# Patient Record
Sex: FEMALE | Race: WHITE | Employment: UNEMPLOYED | URBAN - NONMETROPOLITAN AREA
[De-identification: names, ages, dates, MRNs, and addresses within clinical notes are randomized per-mention and may not be internally consistent; named-entity substitution may affect disease eponyms.]

---

## 2019-11-05 ENCOUNTER — OFFICE VISIT (OUTPATIENT)
Dept: FAMILY MEDICINE CLINIC | Age: 39
End: 2019-11-05

## 2019-11-05 VITALS
HEIGHT: 68 IN | SYSTOLIC BLOOD PRESSURE: 106 MMHG | HEART RATE: 74 BPM | DIASTOLIC BLOOD PRESSURE: 60 MMHG | WEIGHT: 132 LBS | BODY MASS INDEX: 20 KG/M2 | RESPIRATION RATE: 14 BRPM

## 2019-11-05 DIAGNOSIS — F32.A DEPRESSION, UNSPECIFIED DEPRESSION TYPE: ICD-10-CM

## 2019-11-05 DIAGNOSIS — Z13.31 POSITIVE DEPRESSION SCREENING: ICD-10-CM

## 2019-11-05 DIAGNOSIS — G47.00 INSOMNIA, UNSPECIFIED TYPE: ICD-10-CM

## 2019-11-05 DIAGNOSIS — Z00.01 ENCOUNTER FOR WELL ADULT EXAM WITH ABNORMAL FINDINGS: Primary | ICD-10-CM

## 2019-11-05 DIAGNOSIS — R53.82 CHRONIC FATIGUE: ICD-10-CM

## 2019-11-05 PROCEDURE — 99214 OFFICE O/P EST MOD 30 MIN: CPT | Performed by: NURSE PRACTITIONER

## 2019-11-05 PROCEDURE — G0444 DEPRESSION SCREEN ANNUAL: HCPCS | Performed by: NURSE PRACTITIONER

## 2019-11-05 PROCEDURE — G8431 POS CLIN DEPRES SCRN F/U DOC: HCPCS | Performed by: NURSE PRACTITIONER

## 2019-11-05 RX ORDER — TEMAZEPAM 15 MG/1
30 CAPSULE ORAL NIGHTLY PRN
COMMUNITY
Start: 2018-07-27

## 2019-11-05 RX ORDER — DOXEPIN HYDROCHLORIDE 50 MG/1
50 CAPSULE ORAL NIGHTLY
Qty: 30 CAPSULE | Refills: 0 | Status: SHIPPED | OUTPATIENT
Start: 2019-11-05 | End: 2019-11-11

## 2019-11-05 SDOH — HEALTH STABILITY: MENTAL HEALTH: HOW OFTEN DO YOU HAVE A DRINK CONTAINING ALCOHOL?: NEVER

## 2019-11-05 ASSESSMENT — ENCOUNTER SYMPTOMS
SHORTNESS OF BREATH: 0
EYE ITCHING: 0
SINUS PRESSURE: 0
CONSTIPATION: 0
SORE THROAT: 0
COUGH: 0
EYE PAIN: 0
ABDOMINAL PAIN: 0
COLOR CHANGE: 0
DIARRHEA: 0
WHEEZING: 0
BACK PAIN: 0
EYE DISCHARGE: 0
EYE REDNESS: 0

## 2019-11-05 ASSESSMENT — PATIENT HEALTH QUESTIONNAIRE - PHQ9
6. FEELING BAD ABOUT YOURSELF - OR THAT YOU ARE A FAILURE OR HAVE LET YOURSELF OR YOUR FAMILY DOWN: 0
10. IF YOU CHECKED OFF ANY PROBLEMS, HOW DIFFICULT HAVE THESE PROBLEMS MADE IT FOR YOU TO DO YOUR WORK, TAKE CARE OF THINGS AT HOME, OR GET ALONG WITH OTHER PEOPLE: 3
9. THOUGHTS THAT YOU WOULD BE BETTER OFF DEAD, OR OF HURTING YOURSELF: 1
5. POOR APPETITE OR OVEREATING: 3
SUM OF ALL RESPONSES TO PHQ QUESTIONS 1-9: 16
7. TROUBLE CONCENTRATING ON THINGS, SUCH AS READING THE NEWSPAPER OR WATCHING TELEVISION: 0
2. FEELING DOWN, DEPRESSED OR HOPELESS: 3
SUM OF ALL RESPONSES TO PHQ QUESTIONS 1-9: 16
3. TROUBLE FALLING OR STAYING ASLEEP: 3
SUM OF ALL RESPONSES TO PHQ9 QUESTIONS 1 & 2: 6
1. LITTLE INTEREST OR PLEASURE IN DOING THINGS: 3
4. FEELING TIRED OR HAVING LITTLE ENERGY: 3
8. MOVING OR SPEAKING SO SLOWLY THAT OTHER PEOPLE COULD HAVE NOTICED. OR THE OPPOSITE, BEING SO FIGETY OR RESTLESS THAT YOU HAVE BEEN MOVING AROUND A LOT MORE THAN USUAL: 0

## 2019-11-11 ENCOUNTER — TELEPHONE (OUTPATIENT)
Dept: FAMILY MEDICINE CLINIC | Age: 39
End: 2019-11-11

## 2019-11-11 DIAGNOSIS — F32.A DEPRESSION, UNSPECIFIED DEPRESSION TYPE: ICD-10-CM

## 2019-11-11 DIAGNOSIS — G47.00 INSOMNIA, UNSPECIFIED TYPE: ICD-10-CM

## 2019-11-11 DIAGNOSIS — G47.00 INSOMNIA, UNSPECIFIED TYPE: Primary | ICD-10-CM

## 2019-11-11 RX ORDER — DOXEPIN HYDROCHLORIDE 100 MG/1
100 CAPSULE ORAL NIGHTLY
Qty: 30 CAPSULE | Refills: 0 | Status: SHIPPED | OUTPATIENT
Start: 2019-11-11 | End: 2019-11-18

## 2019-11-18 ENCOUNTER — TELEPHONE (OUTPATIENT)
Dept: FAMILY MEDICINE CLINIC | Age: 39
End: 2019-11-18

## 2019-11-18 DIAGNOSIS — F32.A DEPRESSION, UNSPECIFIED DEPRESSION TYPE: ICD-10-CM

## 2019-11-18 DIAGNOSIS — G47.00 INSOMNIA, UNSPECIFIED TYPE: ICD-10-CM

## 2019-11-18 RX ORDER — DOXEPIN HYDROCHLORIDE 150 MG/1
150 CAPSULE ORAL NIGHTLY
Qty: 30 CAPSULE | Refills: 0 | Status: SHIPPED | OUTPATIENT
Start: 2019-11-18 | End: 2019-11-27 | Stop reason: SDUPTHER

## 2019-11-27 ENCOUNTER — OFFICE VISIT (OUTPATIENT)
Dept: FAMILY MEDICINE CLINIC | Age: 39
End: 2019-11-27

## 2019-11-27 VITALS
WEIGHT: 135 LBS | SYSTOLIC BLOOD PRESSURE: 96 MMHG | BODY MASS INDEX: 20.77 KG/M2 | DIASTOLIC BLOOD PRESSURE: 60 MMHG | RESPIRATION RATE: 12 BRPM | HEART RATE: 60 BPM

## 2019-11-27 DIAGNOSIS — F32.A DEPRESSION, UNSPECIFIED DEPRESSION TYPE: Primary | ICD-10-CM

## 2019-11-27 DIAGNOSIS — G47.00 INSOMNIA, UNSPECIFIED TYPE: ICD-10-CM

## 2019-11-27 PROBLEM — G47.9 RESTLESS SLEEPER: Status: ACTIVE | Noted: 2019-11-12

## 2019-11-27 PROCEDURE — 99213 OFFICE O/P EST LOW 20 MIN: CPT | Performed by: NURSE PRACTITIONER

## 2019-11-27 RX ORDER — DOXEPIN HYDROCHLORIDE 150 MG/1
150 CAPSULE ORAL NIGHTLY
Qty: 90 CAPSULE | Refills: 1 | Status: ON HOLD | OUTPATIENT
Start: 2019-12-18 | End: 2021-12-14 | Stop reason: SDUPTHER

## 2019-11-27 ASSESSMENT — ENCOUNTER SYMPTOMS
ABDOMINAL PAIN: 0
EYE DISCHARGE: 0
SINUS PRESSURE: 0
EYE ITCHING: 0
WHEEZING: 0
CONSTIPATION: 0
DIARRHEA: 0
BACK PAIN: 0
COUGH: 0
EYE PAIN: 0
COLOR CHANGE: 0
SHORTNESS OF BREATH: 0

## 2019-11-27 ASSESSMENT — PATIENT HEALTH QUESTIONNAIRE - PHQ9
1. LITTLE INTEREST OR PLEASURE IN DOING THINGS: 0
SUM OF ALL RESPONSES TO PHQ9 QUESTIONS 1 & 2: 0
SUM OF ALL RESPONSES TO PHQ QUESTIONS 1-9: 0
SUM OF ALL RESPONSES TO PHQ QUESTIONS 1-9: 0
2. FEELING DOWN, DEPRESSED OR HOPELESS: 0

## 2021-12-12 ENCOUNTER — HOSPITAL ENCOUNTER (INPATIENT)
Age: 41
LOS: 2 days | Discharge: HOME | DRG: 917 | End: 2021-12-14
Attending: FAMILY MEDICINE | Admitting: INTERNAL MEDICINE
Payer: COMMERCIAL

## 2021-12-12 ENCOUNTER — APPOINTMENT (OUTPATIENT)
Dept: GENERAL RADIOLOGY | Age: 41
DRG: 917 | End: 2021-12-12

## 2021-12-12 DIAGNOSIS — T43.221A: Primary | ICD-10-CM

## 2021-12-12 DIAGNOSIS — F32.A DEPRESSION, UNSPECIFIED DEPRESSION TYPE: ICD-10-CM

## 2021-12-12 DIAGNOSIS — G47.00 INSOMNIA, UNSPECIFIED TYPE: ICD-10-CM

## 2021-12-12 PROBLEM — T50.902A DRUG OVERDOSE, INTENTIONAL SELF-HARM, INITIAL ENCOUNTER (HCC): Status: ACTIVE | Noted: 2021-12-12

## 2021-12-12 LAB
ACETAMINOPHEN LEVEL: < 5 UG/ML (ref 0–20)
ALBUMIN SERPL-MCNC: 4.5 G/DL (ref 3.5–5.1)
ALLEN TEST: POSITIVE
ALLEN TEST: POSITIVE
ALP BLD-CCNC: 59 U/L (ref 38–126)
ALT SERPL-CCNC: 18 U/L (ref 11–66)
AMPHETAMINE+METHAMPHETAMINE URINE SCREEN: NEGATIVE
ANION GAP SERPL CALCULATED.3IONS-SCNC: 14 MEQ/L (ref 8–16)
AST SERPL-CCNC: 25 U/L (ref 5–40)
BACTERIA: ABNORMAL
BARBITURATE QUANTITATIVE URINE: NEGATIVE
BASE EXCESS (CALCULATED): -1.4 MMOL/L (ref -2.5–2.5)
BASE EXCESS (CALCULATED): -2.6 MMOL/L (ref -2.5–2.5)
BASOPHILS # BLD: 0.9 %
BASOPHILS ABSOLUTE: 0 THOU/MM3 (ref 0–0.1)
BENZODIAZEPINE QUANTITATIVE URINE: NEGATIVE
BILIRUB SERPL-MCNC: 0.6 MG/DL (ref 0.3–1.2)
BILIRUBIN DIRECT: < 0.2 MG/DL (ref 0–0.3)
BILIRUBIN URINE: NEGATIVE
BLOOD, URINE: NEGATIVE
BUN BLDV-MCNC: 8 MG/DL (ref 7–22)
CALCIUM IONIZED SERUM: 1.12 MMOL/L (ref 1.12–1.32)
CALCIUM SERPL-MCNC: 9 MG/DL (ref 8.5–10.5)
CANNABINOID QUANTITATIVE URINE: NEGATIVE
CASTS: ABNORMAL /LPF
CASTS: ABNORMAL /LPF
CHARACTER, URINE: ABNORMAL
CHLORIDE BLD-SCNC: 103 MEQ/L (ref 98–111)
CHLORIDE, WHOLE BLOOD: 106 MEQ/L (ref 98–109)
CO2: 21 MEQ/L (ref 23–33)
COCAINE METABOLITE QUANTITATIVE URINE: NEGATIVE
COLLECTED BY:: ABNORMAL
COLLECTED BY:: ABNORMAL
COLOR: YELLOW
CREAT SERPL-MCNC: 0.8 MG/DL (ref 0.4–1.2)
CRYSTALS: ABNORMAL
DEVICE: ABNORMAL
DEVICE: ABNORMAL
EKG ATRIAL RATE: 102 BPM
EKG ATRIAL RATE: 104 BPM
EKG ATRIAL RATE: 129 BPM
EKG ATRIAL RATE: 89 BPM
EKG ATRIAL RATE: 89 BPM
EKG ATRIAL RATE: 92 BPM
EKG ATRIAL RATE: 98 BPM
EKG P AXIS: -56 DEGREES
EKG P AXIS: 73 DEGREES
EKG P AXIS: 74 DEGREES
EKG P AXIS: 74 DEGREES
EKG P AXIS: 77 DEGREES
EKG P AXIS: 83 DEGREES
EKG P AXIS: 83 DEGREES
EKG P-R INTERVAL: 144 MS
EKG P-R INTERVAL: 152 MS
EKG P-R INTERVAL: 164 MS
EKG P-R INTERVAL: 180 MS
EKG P-R INTERVAL: 196 MS
EKG P-R INTERVAL: 202 MS
EKG P-R INTERVAL: 202 MS
EKG Q-T INTERVAL: 386 MS
EKG Q-T INTERVAL: 408 MS
EKG Q-T INTERVAL: 416 MS
EKG Q-T INTERVAL: 422 MS
EKG Q-T INTERVAL: 422 MS
EKG Q-T INTERVAL: 436 MS
EKG Q-T INTERVAL: 438 MS
EKG QRS DURATION: 106 MS
EKG QRS DURATION: 112 MS
EKG QRS DURATION: 116 MS
EKG QRS DURATION: 138 MS
EKG QRS DURATION: 152 MS
EKG QRS DURATION: 154 MS
EKG QRS DURATION: 160 MS
EKG QTC CALCULATION (BAZETT): 513 MS
EKG QTC CALCULATION (BAZETT): 521 MS
EKG QTC CALCULATION (BAZETT): 531 MS
EKG QTC CALCULATION (BAZETT): 532 MS
EKG QTC CALCULATION (BAZETT): 536 MS
EKG QTC CALCULATION (BAZETT): 565 MS
EKG QTC CALCULATION (BAZETT): 568 MS
EKG R AXIS: -63 DEGREES
EKG R AXIS: -65 DEGREES
EKG R AXIS: -69 DEGREES
EKG R AXIS: 39 DEGREES
EKG R AXIS: 78 DEGREES
EKG R AXIS: 79 DEGREES
EKG R AXIS: 8 DEGREES
EKG T AXIS: 79 DEGREES
EKG T AXIS: 80 DEGREES
EKG T AXIS: 82 DEGREES
EKG T AXIS: 83 DEGREES
EKG T AXIS: 83 DEGREES
EKG T AXIS: 84 DEGREES
EKG T AXIS: 86 DEGREES
EKG VENTRICULAR RATE: 102 BPM
EKG VENTRICULAR RATE: 104 BPM
EKG VENTRICULAR RATE: 129 BPM
EKG VENTRICULAR RATE: 89 BPM
EKG VENTRICULAR RATE: 89 BPM
EKG VENTRICULAR RATE: 92 BPM
EKG VENTRICULAR RATE: 98 BPM
EOSINOPHIL # BLD: 2 %
EOSINOPHILS ABSOLUTE: 0.1 THOU/MM3 (ref 0–0.4)
EPITHELIAL CELLS, UA: ABNORMAL /HPF
ERYTHROCYTE [DISTWIDTH] IN BLOOD BY AUTOMATED COUNT: 11.9 % (ref 11.5–14.5)
ERYTHROCYTE [DISTWIDTH] IN BLOOD BY AUTOMATED COUNT: 41.3 FL (ref 35–45)
ETHYL ALCOHOL, SERUM: < 0.01 %
GFR SERPL CREATININE-BSD FRML MDRD: 80 ML/MIN/1.73M2
GLUCOSE BLD-MCNC: 134 MG/DL (ref 70–108)
GLUCOSE BLD-MCNC: 141 MG/DL (ref 70–108)
GLUCOSE, URINE: NEGATIVE MG/DL
GLUCOSE, WHOLE BLOOD: 134 MG/DL (ref 70–108)
HCG,BETA SUBUNIT,QUAL,SERUM: < 0.1 MIU/ML (ref 0–5)
HCO3: 23 MMOL/L (ref 23–28)
HCO3: 23 MMOL/L (ref 23–28)
HCT VFR BLD CALC: 41.7 % (ref 37–47)
HEMOGLOBIN: 14.2 GM/DL (ref 12–16)
IFIO2: 30
IFIO2: 50
IMMATURE GRANS (ABS): 0 THOU/MM3 (ref 0–0.07)
IMMATURE GRANULOCYTES: 0 %
KETONES, URINE: NEGATIVE
LEUKOCYTE ESTERASE, URINE: NEGATIVE
LYMPHOCYTES # BLD: 53.8 %
LYMPHOCYTES ABSOLUTE: 3 THOU/MM3 (ref 1–4.8)
MAGNESIUM: 2.1 MG/DL (ref 1.6–2.4)
MAGNESIUM: 2.1 MG/DL (ref 1.6–2.4)
MAGNESIUM: 2.5 MG/DL (ref 1.6–2.4)
MCH RBC QN AUTO: 32.7 PG (ref 26–33)
MCHC RBC AUTO-ENTMCNC: 34.1 GM/DL (ref 32.2–35.5)
MCV RBC AUTO: 96.1 FL (ref 81–99)
MISCELLANEOUS LAB TEST RESULT: ABNORMAL
MODE: ABNORMAL
MODE: ABNORMAL
MONOCYTES # BLD: 9 %
MONOCYTES ABSOLUTE: 0.5 THOU/MM3 (ref 0.4–1.3)
MRSA SCREEN RT-PCR: NEGATIVE
MUCUS: ABNORMAL
NITRITE, URINE: NEGATIVE
NUCLEATED RED BLOOD CELLS: 0 /100 WBC
O2 SATURATION: 100 %
O2 SATURATION: 99 %
OPIATES, URINE: NEGATIVE
OXYCODONE: NEGATIVE
PCO2: 34 MMHG (ref 35–45)
PCO2: 42 MMHG (ref 35–45)
PH BLOOD GAS: 7.35 (ref 7.35–7.45)
PH BLOOD GAS: 7.43 (ref 7.35–7.45)
PH UA: 8 (ref 5–9)
PHENCYCLIDINE QUANTITATIVE URINE: NEGATIVE
PHOSPHORUS: 1.6 MG/DL (ref 2.4–4.7)
PHOSPHORUS: 2.7 MG/DL (ref 2.4–4.7)
PIP: 16 CMH2O
PIP: 16 CMH2O
PLATELET # BLD: 241 THOU/MM3 (ref 130–400)
PMV BLD AUTO: 9.6 FL (ref 9.4–12.4)
PO2: 143 MMHG (ref 71–104)
PO2: 252 MMHG (ref 71–104)
POC LACTIC ACID: 1 MMOL/L (ref 0.5–1.9)
POTASSIUM REFLEX MAGNESIUM: 3 MEQ/L (ref 3.5–5.2)
POTASSIUM SERPL-SCNC: 3.7 MEQ/L (ref 3.5–5.2)
POTASSIUM SERPL-SCNC: 3.8 MEQ/L (ref 3.5–5.2)
POTASSIUM, WHOLE BLOOD: 2.7 MEQ/L (ref 3.5–4.9)
PROTEIN UA: ABNORMAL MG/DL
RBC # BLD: 4.34 MILL/MM3 (ref 4.2–5.4)
RBC URINE: ABNORMAL /HPF
RENAL EPITHELIAL, UA: ABNORMAL
SALICYLATE, SERUM: < 0.3 MG/DL (ref 2–10)
SARS-COV-2, NAAT: NOT DETECTED
SEG NEUTROPHILS: 34.3 %
SEGMENTED NEUTROPHILS ABSOLUTE COUNT: 1.9 THOU/MM3 (ref 1.8–7.7)
SET PEEP: 6 MMHG
SET PEEP: 7 MMHG
SET RESPIRATORY RATE: 14 BPM
SET RESPIRATORY RATE: 14 BPM
SODIUM BLD-SCNC: 138 MEQ/L (ref 135–145)
SODIUM, WHOLE BLOOD: 143 MEQ/L (ref 138–146)
SOURCE, BLOOD GAS: ABNORMAL
SOURCE, BLOOD GAS: ABNORMAL
SPECIFIC GRAVITY UA: 1.01 (ref 1–1.03)
TOTAL PROTEIN: 7.3 G/DL (ref 6.1–8)
UROBILINOGEN, URINE: 0.2 EU/DL (ref 0–1)
VANCOMYCIN RESISTANT ENTEROCOCCUS: NEGATIVE
WBC # BLD: 5.5 THOU/MM3 (ref 4.8–10.8)
WBC UA: ABNORMAL /HPF
YEAST: ABNORMAL

## 2021-12-12 PROCEDURE — 2000000000 HC ICU R&B

## 2021-12-12 PROCEDURE — 2500000003 HC RX 250 WO HCPCS

## 2021-12-12 PROCEDURE — 6360000002 HC RX W HCPCS: Performed by: INTERNAL MEDICINE

## 2021-12-12 PROCEDURE — 5A1935Z RESPIRATORY VENTILATION, LESS THAN 24 CONSECUTIVE HOURS: ICD-10-PCS | Performed by: STUDENT IN AN ORGANIZED HEALTH CARE EDUCATION/TRAINING PROGRAM

## 2021-12-12 PROCEDURE — 87086 URINE CULTURE/COLONY COUNT: CPT

## 2021-12-12 PROCEDURE — 71045 X-RAY EXAM CHEST 1 VIEW: CPT

## 2021-12-12 PROCEDURE — 96365 THER/PROPH/DIAG IV INF INIT: CPT

## 2021-12-12 PROCEDURE — 6360000002 HC RX W HCPCS: Performed by: STUDENT IN AN ORGANIZED HEALTH CARE EDUCATION/TRAINING PROGRAM

## 2021-12-12 PROCEDURE — 96375 TX/PRO/DX INJ NEW DRUG ADDON: CPT

## 2021-12-12 PROCEDURE — 87641 MR-STAPH DNA AMP PROBE: CPT

## 2021-12-12 PROCEDURE — 84702 CHORIONIC GONADOTROPIN TEST: CPT

## 2021-12-12 PROCEDURE — 83735 ASSAY OF MAGNESIUM: CPT

## 2021-12-12 PROCEDURE — 82947 ASSAY GLUCOSE BLOOD QUANT: CPT

## 2021-12-12 PROCEDURE — 99284 EMERGENCY DEPT VISIT MOD MDM: CPT

## 2021-12-12 PROCEDURE — 2700000000 HC OXYGEN THERAPY PER DAY

## 2021-12-12 PROCEDURE — 80143 DRUG ASSAY ACETAMINOPHEN: CPT

## 2021-12-12 PROCEDURE — 96368 THER/DIAG CONCURRENT INF: CPT

## 2021-12-12 PROCEDURE — 31500 INSERT EMERGENCY AIRWAY: CPT

## 2021-12-12 PROCEDURE — 36600 WITHDRAWAL OF ARTERIAL BLOOD: CPT

## 2021-12-12 PROCEDURE — 96361 HYDRATE IV INFUSION ADD-ON: CPT

## 2021-12-12 PROCEDURE — 36415 COLL VENOUS BLD VENIPUNCTURE: CPT

## 2021-12-12 PROCEDURE — 84295 ASSAY OF SERUM SODIUM: CPT

## 2021-12-12 PROCEDURE — 82803 BLOOD GASES ANY COMBINATION: CPT

## 2021-12-12 PROCEDURE — 99291 CRITICAL CARE FIRST HOUR: CPT | Performed by: INTERNAL MEDICINE

## 2021-12-12 PROCEDURE — 84132 ASSAY OF SERUM POTASSIUM: CPT

## 2021-12-12 PROCEDURE — 2500000003 HC RX 250 WO HCPCS: Performed by: STUDENT IN AN ORGANIZED HEALTH CARE EDUCATION/TRAINING PROGRAM

## 2021-12-12 PROCEDURE — 82435 ASSAY OF BLOOD CHLORIDE: CPT

## 2021-12-12 PROCEDURE — 84100 ASSAY OF PHOSPHORUS: CPT

## 2021-12-12 PROCEDURE — 81001 URINALYSIS AUTO W/SCOPE: CPT

## 2021-12-12 PROCEDURE — 82077 ASSAY SPEC XCP UR&BREATH IA: CPT

## 2021-12-12 PROCEDURE — 93010 ELECTROCARDIOGRAM REPORT: CPT | Performed by: NUCLEAR MEDICINE

## 2021-12-12 PROCEDURE — 94002 VENT MGMT INPAT INIT DAY: CPT

## 2021-12-12 PROCEDURE — 2580000003 HC RX 258: Performed by: STUDENT IN AN ORGANIZED HEALTH CARE EDUCATION/TRAINING PROGRAM

## 2021-12-12 PROCEDURE — 93005 ELECTROCARDIOGRAM TRACING: CPT | Performed by: FAMILY MEDICINE

## 2021-12-12 PROCEDURE — 85025 COMPLETE CBC W/AUTO DIFF WBC: CPT

## 2021-12-12 PROCEDURE — 87081 CULTURE SCREEN ONLY: CPT

## 2021-12-12 PROCEDURE — 80179 DRUG ASSAY SALICYLATE: CPT

## 2021-12-12 PROCEDURE — 87500 VANOMYCIN DNA AMP PROBE: CPT

## 2021-12-12 PROCEDURE — 2500000003 HC RX 250 WO HCPCS: Performed by: FAMILY MEDICINE

## 2021-12-12 PROCEDURE — C9113 INJ PANTOPRAZOLE SODIUM, VIA: HCPCS | Performed by: STUDENT IN AN ORGANIZED HEALTH CARE EDUCATION/TRAINING PROGRAM

## 2021-12-12 PROCEDURE — 93005 ELECTROCARDIOGRAM TRACING: CPT | Performed by: STUDENT IN AN ORGANIZED HEALTH CARE EDUCATION/TRAINING PROGRAM

## 2021-12-12 PROCEDURE — 83605 ASSAY OF LACTIC ACID: CPT

## 2021-12-12 PROCEDURE — 87635 SARS-COV-2 COVID-19 AMP PRB: CPT

## 2021-12-12 PROCEDURE — 2580000003 HC RX 258: Performed by: INTERNAL MEDICINE

## 2021-12-12 PROCEDURE — 80307 DRUG TEST PRSMV CHEM ANLYZR: CPT

## 2021-12-12 PROCEDURE — 82948 REAGENT STRIP/BLOOD GLUCOSE: CPT

## 2021-12-12 PROCEDURE — 80048 BASIC METABOLIC PNL TOTAL CA: CPT

## 2021-12-12 PROCEDURE — 94761 N-INVAS EAR/PLS OXIMETRY MLT: CPT

## 2021-12-12 PROCEDURE — 82330 ASSAY OF CALCIUM: CPT

## 2021-12-12 PROCEDURE — 0BH17EZ INSERTION OF ENDOTRACHEAL AIRWAY INTO TRACHEA, VIA NATURAL OR ARTIFICIAL OPENING: ICD-10-PCS | Performed by: STUDENT IN AN ORGANIZED HEALTH CARE EDUCATION/TRAINING PROGRAM

## 2021-12-12 PROCEDURE — 80076 HEPATIC FUNCTION PANEL: CPT

## 2021-12-12 RX ORDER — POLYETHYLENE GLYCOL 3350 17 G/17G
17 POWDER, FOR SOLUTION ORAL DAILY PRN
Status: DISCONTINUED | OUTPATIENT
Start: 2021-12-12 | End: 2021-12-14 | Stop reason: HOSPADM

## 2021-12-12 RX ORDER — 0.9 % SODIUM CHLORIDE 0.9 %
1000 INTRAVENOUS SOLUTION INTRAVENOUS ONCE
Status: COMPLETED | OUTPATIENT
Start: 2021-12-12 | End: 2021-12-12

## 2021-12-12 RX ORDER — ETOMIDATE 2 MG/ML
INJECTION INTRAVENOUS DAILY PRN
Status: COMPLETED | OUTPATIENT
Start: 2021-12-12 | End: 2021-12-12

## 2021-12-12 RX ORDER — SODIUM CHLORIDE 9 MG/ML
25 INJECTION, SOLUTION INTRAVENOUS PRN
Status: DISCONTINUED | OUTPATIENT
Start: 2021-12-12 | End: 2021-12-14 | Stop reason: HOSPADM

## 2021-12-12 RX ORDER — PROCHLORPERAZINE EDISYLATE 5 MG/ML
10 INJECTION INTRAMUSCULAR; INTRAVENOUS EVERY 6 HOURS PRN
Status: DISCONTINUED | OUTPATIENT
Start: 2021-12-12 | End: 2021-12-14 | Stop reason: HOSPADM

## 2021-12-12 RX ORDER — MIDAZOLAM IN NACL,ISO-OSMOT/PF 50 MG/50ML
1-10 INFUSION BOTTLE (ML) INTRAVENOUS CONTINUOUS
Status: DISCONTINUED | OUTPATIENT
Start: 2021-12-12 | End: 2021-12-14 | Stop reason: HOSPADM

## 2021-12-12 RX ORDER — POTASSIUM CHLORIDE 7.45 MG/ML
10 INJECTION INTRAVENOUS
Status: COMPLETED | OUTPATIENT
Start: 2021-12-12 | End: 2021-12-12

## 2021-12-12 RX ORDER — ROCURONIUM BROMIDE 10 MG/ML
INJECTION, SOLUTION INTRAVENOUS DAILY PRN
Status: COMPLETED | OUTPATIENT
Start: 2021-12-12 | End: 2021-12-12

## 2021-12-12 RX ORDER — PANTOPRAZOLE SODIUM 40 MG/10ML
40 INJECTION, POWDER, LYOPHILIZED, FOR SOLUTION INTRAVENOUS DAILY
Status: DISCONTINUED | OUTPATIENT
Start: 2021-12-12 | End: 2021-12-14 | Stop reason: HOSPADM

## 2021-12-12 RX ORDER — SODIUM CHLORIDE 0.9 % (FLUSH) 0.9 %
5-40 SYRINGE (ML) INJECTION EVERY 12 HOURS SCHEDULED
Status: DISCONTINUED | OUTPATIENT
Start: 2021-12-12 | End: 2021-12-14 | Stop reason: HOSPADM

## 2021-12-12 RX ORDER — ACTIVATED CHARCOAL 208 MG/ML
SUSPENSION ORAL
Status: DISPENSED
Start: 2021-12-12 | End: 2021-12-12

## 2021-12-12 RX ORDER — DEXMEDETOMIDINE HYDROCHLORIDE 4 UG/ML
.2-1.4 INJECTION, SOLUTION INTRAVENOUS CONTINUOUS
Status: DISCONTINUED | OUTPATIENT
Start: 2021-12-12 | End: 2021-12-12

## 2021-12-12 RX ORDER — MAGNESIUM SULFATE HEPTAHYDRATE 40 MG/ML
2000 INJECTION, SOLUTION INTRAVENOUS ONCE
Status: COMPLETED | OUTPATIENT
Start: 2021-12-12 | End: 2021-12-12

## 2021-12-12 RX ORDER — SODIUM CHLORIDE 0.9 % (FLUSH) 0.9 %
10 SYRINGE (ML) INJECTION PRN
Status: DISCONTINUED | OUTPATIENT
Start: 2021-12-12 | End: 2021-12-14 | Stop reason: HOSPADM

## 2021-12-12 RX ORDER — MAGNESIUM SULFATE IN WATER 40 MG/ML
2000 INJECTION, SOLUTION INTRAVENOUS ONCE
Status: COMPLETED | OUTPATIENT
Start: 2021-12-12 | End: 2021-12-12

## 2021-12-12 RX ORDER — POTASSIUM CHLORIDE 7.45 MG/ML
10 INJECTION INTRAVENOUS PRN
Status: DISCONTINUED | OUTPATIENT
Start: 2021-12-12 | End: 2021-12-14 | Stop reason: HOSPADM

## 2021-12-12 RX ADMIN — MAGNESIUM SULFATE HEPTAHYDRATE 2000 MG: 2 INJECTION, SOLUTION INTRAVENOUS at 14:56

## 2021-12-12 RX ADMIN — SODIUM CHLORIDE 1000 ML: 9 INJECTION, SOLUTION INTRAVENOUS at 11:02

## 2021-12-12 RX ADMIN — SODIUM BICARBONATE 50 MEQ: 84 INJECTION, SOLUTION INTRAVENOUS at 11:23

## 2021-12-12 RX ADMIN — Medication 50 MEQ: at 11:30

## 2021-12-12 RX ADMIN — Medication 50 MEQ: at 11:23

## 2021-12-12 RX ADMIN — POTASSIUM CHLORIDE 10 MEQ: 7.46 INJECTION, SOLUTION INTRAVENOUS at 14:41

## 2021-12-12 RX ADMIN — POTASSIUM CHLORIDE 10 MEQ: 7.46 INJECTION, SOLUTION INTRAVENOUS at 15:33

## 2021-12-12 RX ADMIN — POTASSIUM CHLORIDE 10 MEQ: 7.46 INJECTION, SOLUTION INTRAVENOUS at 13:36

## 2021-12-12 RX ADMIN — POTASSIUM CHLORIDE 10 MEQ: 7.46 INJECTION, SOLUTION INTRAVENOUS at 17:46

## 2021-12-12 RX ADMIN — POTASSIUM CHLORIDE 10 MEQ: 7.46 INJECTION, SOLUTION INTRAVENOUS at 12:24

## 2021-12-12 RX ADMIN — MAGNESIUM SULFATE HEPTAHYDRATE 2000 MG: 40 INJECTION, SOLUTION INTRAVENOUS at 11:30

## 2021-12-12 RX ADMIN — Medication 25 MCG/HR: at 11:08

## 2021-12-12 RX ADMIN — PANTOPRAZOLE SODIUM 40 MG: 40 INJECTION, POWDER, FOR SOLUTION INTRAVENOUS at 21:58

## 2021-12-12 RX ADMIN — ETOMIDATE 20 MG: 2 INJECTION INTRAVENOUS at 10:43

## 2021-12-12 RX ADMIN — SODIUM BICARBONATE: 84 INJECTION, SOLUTION INTRAVENOUS at 21:12

## 2021-12-12 RX ADMIN — ENOXAPARIN SODIUM 40 MG: 100 INJECTION SUBCUTANEOUS at 16:39

## 2021-12-12 RX ADMIN — SODIUM CHLORIDE 1000 ML: 9 INJECTION, SOLUTION INTRAVENOUS at 20:07

## 2021-12-12 RX ADMIN — Medication 50 MEQ: at 12:15

## 2021-12-12 RX ADMIN — SODIUM BICARBONATE: 84 INJECTION, SOLUTION INTRAVENOUS at 13:42

## 2021-12-12 RX ADMIN — ROCURONIUM BROMIDE 90 MG: 10 INJECTION, SOLUTION INTRAVENOUS at 10:43

## 2021-12-12 RX ADMIN — POTASSIUM CHLORIDE 10 MEQ: 7.46 INJECTION, SOLUTION INTRAVENOUS at 16:37

## 2021-12-12 RX ADMIN — POTASSIUM PHOSPHATE, MONOBASIC AND POTASSIUM PHOSPHATE, DIBASIC 10 MMOL: 224; 236 INJECTION, SOLUTION, CONCENTRATE INTRAVENOUS at 18:50

## 2021-12-12 ASSESSMENT — PULMONARY FUNCTION TESTS
PIF_VALUE: 16
PIF_VALUE: 15.9
PIF_VALUE: 16

## 2021-12-12 ASSESSMENT — ENCOUNTER SYMPTOMS
ABDOMINAL PAIN: 0
CONSTIPATION: 0
VOMITING: 0
DIARRHEA: 0
NAUSEA: 1

## 2021-12-12 NOTE — ED PROVIDER NOTES
[place] ENCOUNTER          Pt Name: Mila Brandon  MRN: [id number]  [place] 1980  Date of evaluation: 12/12/2021  Treating Resident Physician: Clay Cason MD  Supervising Physician: Ebenezer Lam MD    CHIEF COMPLAINT       Chief Complaint   Patient presents with    Drug Overdose     History obtained from chart review and the patient. HISTORY OF PRESENT ILLNESS    Nathalie Hein is a 46 y.o. female who presents to the emergency department for evaluation of overdose. Nathalie states she took 25 capsules of doxepin 150 mg at around [id number] this a.m. She initially said her intent was 1 to be relaxed. When her  arrived in the room they relate a story that she had prepared her medications for this morning including her doxepin and multiple over-the-counter vitamin she takes daily and put this in a prescription bottle last night. This morning on her way out of the house to [facility] she mistakenly took the bottle of doxepin instead of the bottle of other tablet she had prepared. She denies suicidal or homicidal ideation. Both her and her  states she would like to get warts in her stomach out and wants her stomach pumped. Medical history significant for Lyme disease. Does not take any other medications besides doxepin and multiple over-the-counter vitamin tablets. The patient has no other acute complaints at this time. REVIEW OF SYSTEMS   Review of Systems   Unable to perform ROS: Mental status change (Full ROS not able to be performed before patient's mental status changed.)   Gastrointestinal: Positive for nausea. Negative for abdominal pain, constipation, diarrhea and vomiting. Psychiatric/Behavioral: Negative for confusion and suicidal ideas. PAST MEDICAL AND SURGICAL HISTORY     Past Medical History:   Diagnosis Date    Lyme disease      History reviewed. No pertinent surgical history.       MEDICATIONS Current Facility-Administered Medications:     charcoal (ACTIDOSE/SORBITOL) 25 GM/120ML liquid, , , ,     fentaNYL 500 mcg in sodium chloride 0.9% 100 ml infusion, 12.5-200 mcg/hr, IntraVENous, Continuous, Ruy Jesus MD, Last Rate: 5 mL/hr at 12/12/21 1108, 25 mcg/hr at 12/12/21 1108    dexmedetomidine (PRECEDEX) 400 mcg in sodium chloride 0.9 % 100 mL infusion, 0.2-1.4 mcg/kg/hr, IntraVENous, Continuous, Ruy Jesus MD    sodium bicarbonate 150 mEq in dextrose 5 % 1,000 mL infusion, , IntraVENous, Continuous, Ruy Jesus MD, Last Rate: 150 mL/hr at 12/12/21 1342, New Bag at 12/12/21 1342    sodium chloride flush 0.9 % injection 5-40 mL, 5-40 mL, IntraVENous, 2 times per day, Antoinette Law DO    sodium chloride flush 0.9 % injection 10 mL, 10 mL, IntraVENous, PRN, Antoinette Law DO    0.9 % sodium chloride infusion, 25 mL, IntraVENous, PRN, Antoinette Law DO    enoxaparin (LOVENOX) injection 40 mg, 40 mg, SubCUTAneous, Q24H, Antoinette Law DO, 40 mg at 12/12/21 1639    polyethylene glycol (GLYCOLAX) packet 17 g, 17 g, Oral, Daily PRN, Antoinette Law DO    prochlorperazine (COMPAZINE) injection 10 mg, 10 mg, IntraVENous, Q6H PRN, Antoinette Law DO    potassium (CARDIAC) replacement protocol, , Other, RX Placeholder, Antoinette Law DO    magnesium replacement protocol, , Other, RX PlaceholderAntoinette DO    potassium chloride 10 mEq/100 mL IVPB (Peripheral Line), 10 mEq, IntraVENous, PRN, Lilia Karimi MD, Last Rate: 100 mL/hr at 12/12/21 1746, 10 mEq at 12/12/21 1746    phosphorus replacement protocol, , Other, RX Placeholder, Pastor Chavarria, DO    potassium phosphate 10 mmol in dextrose 5 % 250 mL IVPB, 10 mmol, IntraVENous, Once, Antoinette Law DO, Last Rate: 62.5 mL/hr at 12/12/21 1850, 10 mmol at 12/12/21 1850    pantoprazole (PROTONIX) injection 40 mg, 40 mg, IntraVENous, Daily, Antoinette Law,     0.9 % sodium chloride bolus, 1,000 mL, IntraVENous, Once, Lilia Karimi MD, Last Rate: 1,000 mL/hr at 12/12/21 2007, 1,000 mL at 12/12/21 2007      SOCIAL HISTORY     Social History     Social History Narrative    Not on file     Social History     Tobacco Use    Smoking status: Never Smoker    Smokeless tobacco: Never Used   Substance Use Topics    Alcohol use: Never    Drug use: Never         ALLERGIES   No Known Allergies      FAMILY HISTORY     Family History   Problem Relation Age of Onset    Cancer Mother     Diabetes Maternal Grandmother     Cancer Paternal Grandmother     Diabetes Paternal Grandfather          PREVIOUS RECORDS   Previous records reviewed: This is this patient's first visit to [facility] ED, no previous records available on EMR. .        PHYSICAL EXAM     ED Triage Vitals [12/12/21 1017]   BP Temp Temp Source Pulse Resp SpO2 Height Weight   131/80 98.7 °F (37.1 °C) Oral 140 18 100 % 5' 7\" (1.702 m) 135 lb (61.2 kg)     Initial vital signs and nursing assessment reviewed and abnormal from Tachycardia. Body mass index is 23.79 kg/m². Pulsoximetry is normal per my interpretation. Additional Vital Signs:  Vitals:    12/12/21 1900   BP: (!) 88/59   Pulse: 91   Resp: 14   Temp:    SpO2: 99%       Physical Exam  Vitals and nursing note reviewed. Constitutional:       General: She is in acute distress. Appearance: She is normal weight. She is not ill-appearing, toxic-appearing or diaphoretic. HENT:      Head: Normocephalic and atraumatic. Mouth/Throat:      Pharynx: Oropharynx is clear. Eyes:      General: No scleral icterus. Right eye: No discharge. Left eye: No discharge. Conjunctiva/sclera: Conjunctivae normal.   Cardiovascular:      Rate and Rhythm: Regular rhythm. Tachycardia present. Pulses: Normal pulses. Heart sounds: Normal heart sounds. Pulmonary:      Effort: Pulmonary effort is normal.      Breath sounds: Normal breath sounds. Abdominal:      General: Abdomen is flat.    Musculoskeletal:         General: Normal range of motion. Cervical back: Normal range of motion. Skin:     General: Skin is warm and dry. Capillary Refill: Capillary refill takes less than 2 seconds. Neurological:      Mental Status: She is alert and oriented to person, place, and time. Psychiatric:         Mood and Affect: Mood normal.         Behavior: Behavior normal.               MEDICAL DECISION MAKING   Initial Differential Diagnosis:   1. Intentional doxepine overdose  2. Altered mental status  3. Metabolic acidosis  4. Co-ingestion of other medications or alcohol    Plan:    ECG   IV access   CBC, BMP, LFT, ethanol, salicylates, acetaminophen, pregnancy   IV fluids   Urinalysis, urine drug screen   Oral charcoal   Consult Poison Control    Summary:  Patient and her  were initially adamant that they wanted her stomach to be pumped to remove the pills. Both myself and Dr. Lucius Pedroza explained to them that we no longer pump stomachs as this usually causes more harm than good in overdose situations. Given that she recently swallowed the capsules our initial plan was to give her activated charcoal by mouth. Soon after this patient had a change in mental status with no longer being aler, was still responsive to verbal and tactile stimulation. Reassessment her mental status was rapidly declining. Out of concern for further decline in mental status and potential for not be able to protect her airway she was emergently intubated using etomidate and rocuronium. Preoxygenation with nonrebreather and to BVM. Uneventful successful intubation using glide scope and a 7-1/2 tube. OG was inserted by myself. Placement was confirmed by chest x-ray. Suction of OG showed light blue-colored liquid. Per patient's  her duloxetine pills are half white half blue capsules. Postintubation patient was continued on IV fluid resuscitation. ABG showed hypokalemia.   Given patient's widened QRS and QTc she also received magnesium, potassium repletion, 3 A of bicarb followed by bicarb infusion. This treatment plan was in consultation with poison control. Point of care ultrasound showed plethoric IVC. Patient's vitals otherwise remained stable while in the emergency department. ICU was consulted and they will admit this patient to their service.       ED RESULTS   Laboratory results:  Labs Reviewed   BASIC METABOLIC PANEL W/ REFLEX TO MG FOR LOW K - Abnormal; Notable for the following components:       Result Value    Potassium reflex Magnesium 3.0 (*)     CO2 21 (*)     Glucose 134 (*)     All other components within normal limits   BLOOD GAS, ARTERIAL - Abnormal; Notable for the following components:    PO2 252 (*)     Base Excess (Calculated) -2.6 (*)     All other components within normal limits   SALICYLATE LEVEL - Abnormal; Notable for the following components:    Salicylate, Serum < 0.3 (*)     All other components within normal limits   URINALYSIS WITH MICROSCOPIC - Abnormal; Notable for the following components:    Protein, UA TRACE (*)     Character, Urine CLOUDY (*)     All other components within normal limits   GLOMERULAR FILTRATION RATE, ESTIMATED - Abnormal; Notable for the following components:    Est, Glom Filt Rate 80 (*)     All other components within normal limits   POTASSIUM, WHOLE BLOOD - Abnormal; Notable for the following components:    Potassium, Whole Blood 2.7 (*)     All other components within normal limits   GLUCOSE, WHOLE BLOOD - Abnormal; Notable for the following components:    Glucose, Whole Blood 134 (*)     All other components within normal limits   BLOOD GAS, ARTERIAL - Abnormal; Notable for the following components:    PCO2 34 (*)     PO2 143 (*)     All other components within normal limits   MAGNESIUM - Abnormal; Notable for the following components:    Magnesium 2.5 (*)     All other components within normal limits   PHOSPHORUS - Abnormal; Notable for the following components: Phosphorus 1.6 (*)     All other components within normal limits   POCT GLUCOSE - Abnormal; Notable for the following components:    POC Glucose 141 (*)     All other components within normal limits   COVID-19, RAPID   CULTURE, MRSA, SCREENING   VRE SCREEN BY PCR   CULTURE, URINE   CULTURE, RESPIRATORY   CBC WITH AUTO DIFFERENTIAL   HEPATIC FUNCTION PANEL   ETHANOL   ACETAMINOPHEN LEVEL   URINE DRUG SCREEN   HCG, QUANTITATIVE, PREGNANCY   ANION GAP   MAGNESIUM   CHLORIDE, WHOLE BLOOD   CALCIUM IONIZED SERUM   POC LACTIC ACID   SODIUM, WHOLE BLOOD   POTASSIUM   MRSA BY PCR   MAGNESIUM   MAGNESIUM   POTASSIUM   POTASSIUM   MAGNESIUM   POTASSIUM   PHOSPHORUS   PHOSPHORUS   PHOSPHORUS   BASIC METABOLIC PANEL W/ REFLEX TO MG FOR LOW K   CBC   MAGNESIUM   POTASSIUM   PHOSPHORUS       Radiologic studies results:  XR CHEST PORTABLE   Final Result   Endotracheal tube and enteric tube in good position without evidence of acute abnormality. **This report has been created using voice recognition software. It may contain minor errors which are inherent in voice recognition technology. **      Final report electronically signed by Dr. Edilia Bauer MD on 12/12/2021 11:04 AM          ED Medications administered this visit:   Medications   charcoal (ACTIDOSE/SORBITOL) 25 GM/120ML liquid (  Not Given 12/12/21 1034)   fentaNYL 500 mcg in sodium chloride 0.9% 100 ml infusion (25 mcg/hr IntraVENous New Bag 12/12/21 1108)   dexmedetomidine (PRECEDEX) 400 mcg in sodium chloride 0.9 % 100 mL infusion ( IntraVENous Canceled Entry 12/12/21 1815)   sodium bicarbonate 150 mEq in dextrose 5 % 1,000 mL infusion ( IntraVENous New Bag 12/12/21 1342)   sodium chloride flush 0.9 % injection 5-40 mL (5 mLs IntraVENous Not Given 12/12/21 1959)   sodium chloride flush 0.9 % injection 10 mL (has no administration in time range)   0.9 % sodium chloride infusion (has no administration in time range)   enoxaparin (LOVENOX) injection 40 mg (40 mg SubCUTAneous Given 12/12/21 1639)   polyethylene glycol (GLYCOLAX) packet 17 g (has no administration in time range)   prochlorperazine (COMPAZINE) injection 10 mg (has no administration in time range)   potassium (CARDIAC) replacement protocol ( Other Canceled Entry 12/12/21 1458)   magnesium replacement protocol ( Other Canceled Entry 12/12/21 1458)   potassium chloride 10 mEq/100 mL IVPB (Peripheral Line) (10 mEq IntraVENous New Bag 12/12/21 1746)   phosphorus replacement protocol ( Other Canceled Entry 12/12/21 1854)   potassium phosphate 10 mmol in dextrose 5 % 250 mL IVPB (10 mmol IntraVENous New Bag 12/12/21 1850)   pantoprazole (PROTONIX) injection 40 mg (has no administration in time range)   0.9 % sodium chloride bolus (1,000 mLs IntraVENous New Bag 12/12/21 2007)   etomidate (AMIDATE) injection (20 mg IntraVENous Given 12/12/21 1043)   rocuronium (ZEMURON) injection (90 mg IntraVENous Given 12/12/21 1043)   0.9 % sodium chloride bolus (0 mLs IntraVENous Stopped 12/12/21 1332)   0.9 % sodium chloride bolus (0 mLs IntraVENous Stopped 12/12/21 1332)   potassium chloride 10 mEq/100 mL IVPB (Peripheral Line) (10 mEq IntraVENous New Bag 12/12/21 1336)   magnesium sulfate 2000 mg in 50 mL IVPB premix (0 mg IntraVENous Stopped 12/12/21 1337)   sodium bicarbonate 8.4 % injection 50 mEq (50 mEq IntraVENous Given 12/12/21 1123)   sodium bicarbonate 8.4 % injection 50 mEq (50 mEq IntraVENous Given 12/12/21 1130)   sodium bicarbonate 8.4 % injection 50 mEq (50 mEq IntraVENous Given 12/12/21 1215)   magnesium sulfate 2000 mg in dextrose 50 mL IVPB (0 mg IntraVENous Stopped 12/12/21 1656)         ED COURSE     ED Course as of 12/12/21 2009   Sun Dec 12, 2021   1031 Call to poison control [SC]   1113 Potassium 2.1 on ABG.  [SC]   1158 Potassium(!): 3.0 [SC]   1201 Discussed patient with [place] (ICU), they accept patient for admission and will place admission order. [SC]   [phone] Discussed test result with patient's . [SC]   1340 Potassium running. Bicarb infusion starting. BP stable. Will obtain follow-up ECG. Family at bedside. [SC]      ED Course User Index  [SC] Maycol Terry MD     Intubation    Date/Time: 12/12/2021 7:40 PM  Performed by: Maycol Terry MD  Authorized by: Elita Duane, MD     Consent:     Consent obtained:  Emergent situation  Pre-procedure details:     Patient status:  Altered mental status    Mallampati score:  II    Pretreatment meds: etomidate. Paralytics:  Rocuronium  Procedure details:     Preoxygenation:  Bag valve mask    CPR in progress: no      Intubation method:  Oral    Oral intubation technique:  Video-assisted    Laryngoscope size: GlideScope S3. Tube size (mm):  7.5    Tube type:  Cuffed    Number of attempts:  1    Cricoid pressure: no      Tube visualized through cords: yes    Placement assessment:     ETT to lip:  24    Tube secured with:  ETT olson    Breath sounds:  Equal and absent over the epigastrium    Placement verification: chest rise, condensation, CXR verification, equal breath sounds, ETCO2 detector and tube exhalation      CXR findings:  ETT in proper place  Post-procedure details:     Patient tolerance of procedure: Tolerated well, no immediate complications        MEDICATION CHANGES     Current Discharge Medication List            FINAL DISPOSITION     Final diagnoses:   SSRI overdose, accidental or unintentional, initial encounter     Condition: condition: critical  Dispo: Admit to ICU    This transcription was electronically signed. Parts of this transcriptions may have been dictated by use of voice recognition software and electronically transcribed, and parts may have been transcribed with the assistance of an ED scribe. The transcription may contain errors not detected in proofreading. Please refer to my supervising physician's documentation if my documentation differs.     Electronically Signed: Dre Love MD, 12/12/21, 8:09 PM Pepper Mcfarlane MD  Resident  12/12/21 1948       Pepper Mcfarlane MD  Resident  12/12/21 2010

## 2021-12-12 NOTE — ED TRIAGE NOTES
Pt presents to the ED after taking 25 tablets of 150 mg doxipine. Pt states today she was in a hurry going to [employer], and took the bottle. Pt states she mixed what she thought was all of her everyday vitamins, but instead it was the doxepin. Pt denies SI/ HI. Patient states she too the medication at 0955. Pt appears anxious. Pt family upset because he just wants stomach pumped and wants to go home.

## 2021-12-12 NOTE — ED NOTES
Pt refusing IV and states she just wants medication sucked out of her.      Jany Rojas RN  12/12/21 1097

## 2021-12-12 NOTE — PROGRESS NOTES
Patient arrived to unit from ed via cart. Patient transferred to ICU bed and placed on continuous ICU bedside monitor. Patient admitted for Drug overdose, intentional self-harm, initial encounter ([facility] [address] Paola Ramon  SSRI overdose, accidental or unintentional, initial encounter [T43.221A]. Vitals obtained. See flowsheets. Patient's IV access includes rfa,lfa. Current infusions and rates of infusion include fentanyl. Potassium,.9ns, hco3. . Assessment completed by isi. Two nurse skin assessment completed by isi and sahmeka. See flowsheets for assessment details. Policies and procedures of ICU unable to be explained to patient at this time. Family member(s)/representative(s) present at time of admission include . Patient rights explained to family member(s)/representatives and patient, as able. Patient/patient's family member(s)/representative(s) N/A to have physician notified of their admission. All questions posed by patient's family member(s)/representative(s) and patient answered at this time.

## 2021-12-12 NOTE — ED NOTES
Dr. Shawn Oropeza and Dr. Muñiz Proper at bedside to assess due to patient being difficult to arouse. Pt respirations even and unlabored.         Kamila Treviño RN  12/12/21 4386

## 2021-12-12 NOTE — PROGRESS NOTES
Pt ventilated with ambu bag and mask prior to intubation using 100% fio2 and after intubation to ett. Pt intubated on first attempt with 7.5 ett by dr Francesca Valderrama. Good bilat breath sounds and color change on co2 detector. ett secured at 24 cm at lips. Pt placed on vent.

## 2021-12-12 NOTE — ED NOTES
Dr. Mila Laws and Dr. Karrie Aguila and respiratory at bedside to intubate.       Patel Huertas RN  12/12/21 5840

## 2021-12-13 LAB
ANION GAP SERPL CALCULATED.3IONS-SCNC: 11 MEQ/L (ref 8–16)
BUN BLDV-MCNC: 4 MG/DL (ref 7–22)
CALCIUM IONIZED: 0.93 MMOL/L (ref 1.12–1.32)
CALCIUM SERPL-MCNC: 6.2 MG/DL (ref 8.5–10.5)
CHLORIDE BLD-SCNC: 96 MEQ/L (ref 98–111)
CO2: 27 MEQ/L (ref 23–33)
CREAT SERPL-MCNC: 0.5 MG/DL (ref 0.4–1.2)
EKG ATRIAL RATE: 110 BPM
EKG ATRIAL RATE: 112 BPM
EKG ATRIAL RATE: 85 BPM
EKG ATRIAL RATE: 93 BPM
EKG P AXIS: 68 DEGREES
EKG P AXIS: 69 DEGREES
EKG P AXIS: 72 DEGREES
EKG P AXIS: 73 DEGREES
EKG P-R INTERVAL: 170 MS
EKG P-R INTERVAL: 172 MS
EKG P-R INTERVAL: 184 MS
EKG P-R INTERVAL: 186 MS
EKG Q-T INTERVAL: 364 MS
EKG Q-T INTERVAL: 368 MS
EKG Q-T INTERVAL: 418 MS
EKG Q-T INTERVAL: 428 MS
EKG QRS DURATION: 102 MS
EKG QRS DURATION: 106 MS
EKG QRS DURATION: 110 MS
EKG QRS DURATION: 116 MS
EKG QTC CALCULATION (BAZETT): 496 MS
EKG QTC CALCULATION (BAZETT): 498 MS
EKG QTC CALCULATION (BAZETT): 509 MS
EKG QTC CALCULATION (BAZETT): 519 MS
EKG R AXIS: 37 DEGREES
EKG R AXIS: 44 DEGREES
EKG R AXIS: 60 DEGREES
EKG R AXIS: 60 DEGREES
EKG T AXIS: 73 DEGREES
EKG T AXIS: 76 DEGREES
EKG T AXIS: 78 DEGREES
EKG T AXIS: 86 DEGREES
EKG VENTRICULAR RATE: 110 BPM
EKG VENTRICULAR RATE: 112 BPM
EKG VENTRICULAR RATE: 85 BPM
EKG VENTRICULAR RATE: 93 BPM
ERYTHROCYTE [DISTWIDTH] IN BLOOD BY AUTOMATED COUNT: 12 % (ref 11.5–14.5)
ERYTHROCYTE [DISTWIDTH] IN BLOOD BY AUTOMATED COUNT: 41.9 FL (ref 35–45)
GFR SERPL CREATININE-BSD FRML MDRD: > 90 ML/MIN/1.73M2
GLUCOSE BLD-MCNC: 100 MG/DL (ref 70–108)
GLUCOSE BLD-MCNC: 394 MG/DL (ref 70–108)
GLUCOSE BLD-MCNC: 82 MG/DL (ref 70–108)
HCT VFR BLD CALC: 33 % (ref 37–47)
HEMOGLOBIN: 11.3 GM/DL (ref 12–16)
MAGNESIUM: 1.5 MG/DL (ref 1.6–2.4)
MAGNESIUM: 2 MG/DL (ref 1.6–2.4)
MAGNESIUM: 2 MG/DL (ref 1.6–2.4)
MAGNESIUM: 2.1 MG/DL (ref 1.6–2.4)
MCH RBC QN AUTO: 32.9 PG (ref 26–33)
MCHC RBC AUTO-ENTMCNC: 34.2 GM/DL (ref 32.2–35.5)
MCV RBC AUTO: 96.2 FL (ref 81–99)
PHOSPHORUS: 1.3 MG/DL (ref 2.4–4.7)
PHOSPHORUS: 2.1 MG/DL (ref 2.4–4.7)
PHOSPHORUS: 2.1 MG/DL (ref 2.4–4.7)
PHOSPHORUS: 2.2 MG/DL (ref 2.4–4.7)
PLATELET # BLD: 144 THOU/MM3 (ref 130–400)
PMV BLD AUTO: 11.2 FL (ref 9.4–12.4)
POTASSIUM REFLEX MAGNESIUM: 2.6 MEQ/L (ref 3.5–5.2)
POTASSIUM SERPL-SCNC: 2.6 MEQ/L (ref 3.5–5.2)
POTASSIUM SERPL-SCNC: 3.4 MEQ/L (ref 3.5–5.2)
POTASSIUM SERPL-SCNC: 3.4 MEQ/L (ref 3.5–5.2)
POTASSIUM SERPL-SCNC: 3.6 MEQ/L (ref 3.5–5.2)
RBC # BLD: 3.43 MILL/MM3 (ref 4.2–5.4)
SODIUM BLD-SCNC: 134 MEQ/L (ref 135–145)
WBC # BLD: 10.3 THOU/MM3 (ref 4.8–10.8)

## 2021-12-13 PROCEDURE — 99232 SBSQ HOSP IP/OBS MODERATE 35: CPT | Performed by: INTERNAL MEDICINE

## 2021-12-13 PROCEDURE — 84132 ASSAY OF SERUM POTASSIUM: CPT

## 2021-12-13 PROCEDURE — 83735 ASSAY OF MAGNESIUM: CPT

## 2021-12-13 PROCEDURE — 82948 REAGENT STRIP/BLOOD GLUCOSE: CPT

## 2021-12-13 PROCEDURE — 85027 COMPLETE CBC AUTOMATED: CPT

## 2021-12-13 PROCEDURE — 93005 ELECTROCARDIOGRAM TRACING: CPT | Performed by: STUDENT IN AN ORGANIZED HEALTH CARE EDUCATION/TRAINING PROGRAM

## 2021-12-13 PROCEDURE — 6360000002 HC RX W HCPCS

## 2021-12-13 PROCEDURE — 2700000000 HC OXYGEN THERAPY PER DAY

## 2021-12-13 PROCEDURE — 82330 ASSAY OF CALCIUM: CPT

## 2021-12-13 PROCEDURE — 2580000003 HC RX 258: Performed by: STUDENT IN AN ORGANIZED HEALTH CARE EDUCATION/TRAINING PROGRAM

## 2021-12-13 PROCEDURE — 6360000002 HC RX W HCPCS: Performed by: FAMILY MEDICINE

## 2021-12-13 PROCEDURE — 94761 N-INVAS EAR/PLS OXIMETRY MLT: CPT

## 2021-12-13 PROCEDURE — 80048 BASIC METABOLIC PNL TOTAL CA: CPT

## 2021-12-13 PROCEDURE — 94003 VENT MGMT INPAT SUBQ DAY: CPT

## 2021-12-13 PROCEDURE — 6360000002 HC RX W HCPCS: Performed by: INTERNAL MEDICINE

## 2021-12-13 PROCEDURE — 93010 ELECTROCARDIOGRAM REPORT: CPT | Performed by: NUCLEAR MEDICINE

## 2021-12-13 PROCEDURE — 2500000003 HC RX 250 WO HCPCS: Performed by: STUDENT IN AN ORGANIZED HEALTH CARE EDUCATION/TRAINING PROGRAM

## 2021-12-13 PROCEDURE — 82947 ASSAY GLUCOSE BLOOD QUANT: CPT

## 2021-12-13 PROCEDURE — 93010 ELECTROCARDIOGRAM REPORT: CPT | Performed by: INTERNAL MEDICINE

## 2021-12-13 PROCEDURE — 6370000000 HC RX 637 (ALT 250 FOR IP): Performed by: STUDENT IN AN ORGANIZED HEALTH CARE EDUCATION/TRAINING PROGRAM

## 2021-12-13 PROCEDURE — 2580000003 HC RX 258: Performed by: INTERNAL MEDICINE

## 2021-12-13 PROCEDURE — 1200000003 HC TELEMETRY R&B

## 2021-12-13 PROCEDURE — 84100 ASSAY OF PHOSPHORUS: CPT

## 2021-12-13 PROCEDURE — C9113 INJ PANTOPRAZOLE SODIUM, VIA: HCPCS | Performed by: STUDENT IN AN ORGANIZED HEALTH CARE EDUCATION/TRAINING PROGRAM

## 2021-12-13 PROCEDURE — 6360000002 HC RX W HCPCS: Performed by: STUDENT IN AN ORGANIZED HEALTH CARE EDUCATION/TRAINING PROGRAM

## 2021-12-13 PROCEDURE — 36415 COLL VENOUS BLD VENIPUNCTURE: CPT

## 2021-12-13 RX ORDER — POTASSIUM CHLORIDE 20 MEQ/1
40 TABLET, EXTENDED RELEASE ORAL PRN
Status: DISCONTINUED | OUTPATIENT
Start: 2021-12-13 | End: 2021-12-14 | Stop reason: HOSPADM

## 2021-12-13 RX ORDER — DEXTROSE MONOHYDRATE 50 MG/ML
100 INJECTION, SOLUTION INTRAVENOUS PRN
Status: DISCONTINUED | OUTPATIENT
Start: 2021-12-13 | End: 2021-12-14 | Stop reason: HOSPADM

## 2021-12-13 RX ORDER — LORAZEPAM 2 MG/ML
1 INJECTION INTRAMUSCULAR ONCE
Status: COMPLETED | OUTPATIENT
Start: 2021-12-13 | End: 2021-12-13

## 2021-12-13 RX ORDER — POTASSIUM CHLORIDE 7.45 MG/ML
10 INJECTION INTRAVENOUS PRN
Status: DISCONTINUED | OUTPATIENT
Start: 2021-12-13 | End: 2021-12-14 | Stop reason: HOSPADM

## 2021-12-13 RX ORDER — LORAZEPAM 2 MG/ML
1 INJECTION INTRAMUSCULAR EVERY 4 HOURS PRN
Status: DISCONTINUED | OUTPATIENT
Start: 2021-12-13 | End: 2021-12-13

## 2021-12-13 RX ORDER — ALPRAZOLAM 0.5 MG/1
0.5 TABLET ORAL 3 TIMES DAILY PRN
Status: DISCONTINUED | OUTPATIENT
Start: 2021-12-13 | End: 2021-12-14 | Stop reason: HOSPADM

## 2021-12-13 RX ORDER — DEXTROSE MONOHYDRATE 25 G/50ML
12.5 INJECTION, SOLUTION INTRAVENOUS PRN
Status: DISCONTINUED | OUTPATIENT
Start: 2021-12-13 | End: 2021-12-14 | Stop reason: HOSPADM

## 2021-12-13 RX ORDER — POTASSIUM CHLORIDE 20 MEQ/1
20 TABLET, EXTENDED RELEASE ORAL PRN
Status: DISCONTINUED | OUTPATIENT
Start: 2021-12-13 | End: 2021-12-14 | Stop reason: HOSPADM

## 2021-12-13 RX ORDER — LORAZEPAM 2 MG/ML
INJECTION INTRAMUSCULAR
Status: COMPLETED
Start: 2021-12-13 | End: 2021-12-13

## 2021-12-13 RX ORDER — NICOTINE POLACRILEX 4 MG
15 LOZENGE BUCCAL PRN
Status: DISCONTINUED | OUTPATIENT
Start: 2021-12-13 | End: 2021-12-14 | Stop reason: HOSPADM

## 2021-12-13 RX ORDER — MAGNESIUM SULFATE IN WATER 40 MG/ML
2000 INJECTION, SOLUTION INTRAVENOUS PRN
Status: DISCONTINUED | OUTPATIENT
Start: 2021-12-13 | End: 2021-12-14 | Stop reason: HOSPADM

## 2021-12-13 RX ADMIN — CALCIUM GLUCONATE 2000 MG: 98 INJECTION, SOLUTION INTRAVENOUS at 16:33

## 2021-12-13 RX ADMIN — SODIUM BICARBONATE: 84 INJECTION, SOLUTION INTRAVENOUS at 04:53

## 2021-12-13 RX ADMIN — POTASSIUM CHLORIDE 10 MEQ: 7.46 INJECTION, SOLUTION INTRAVENOUS at 06:52

## 2021-12-13 RX ADMIN — POTASSIUM CHLORIDE 10 MEQ: 7.46 INJECTION, SOLUTION INTRAVENOUS at 08:51

## 2021-12-13 RX ADMIN — POTASSIUM CHLORIDE 10 MEQ: 7.46 INJECTION, SOLUTION INTRAVENOUS at 11:00

## 2021-12-13 RX ADMIN — MAGNESIUM SULFATE HEPTAHYDRATE 2000 MG: 40 INJECTION, SOLUTION INTRAVENOUS at 06:47

## 2021-12-13 RX ADMIN — POTASSIUM PHOSPHATE, MONOBASIC AND POTASSIUM PHOSPHATE, DIBASIC 10 MMOL: 224; 236 INJECTION, SOLUTION, CONCENTRATE INTRAVENOUS at 21:17

## 2021-12-13 RX ADMIN — POTASSIUM CHLORIDE 10 MEQ: 7.46 INJECTION, SOLUTION INTRAVENOUS at 09:53

## 2021-12-13 RX ADMIN — LORAZEPAM 1 MG: 2 INJECTION INTRAMUSCULAR at 01:40

## 2021-12-13 RX ADMIN — LORAZEPAM 1 MG: 2 INJECTION, SOLUTION INTRAMUSCULAR; INTRAVENOUS at 05:04

## 2021-12-13 RX ADMIN — SODIUM CHLORIDE, PRESERVATIVE FREE 10 ML: 5 INJECTION INTRAVENOUS at 21:08

## 2021-12-13 RX ADMIN — Medication 50 MEQ: at 13:56

## 2021-12-13 RX ADMIN — POTASSIUM CHLORIDE 10 MEQ: 7.46 INJECTION, SOLUTION INTRAVENOUS at 12:04

## 2021-12-13 RX ADMIN — POTASSIUM CHLORIDE 10 MEQ: 7.46 INJECTION, SOLUTION INTRAVENOUS at 07:49

## 2021-12-13 RX ADMIN — POTASSIUM PHOSPHATE, MONOBASIC AND POTASSIUM PHOSPHATE, DIBASIC 17 MMOL: 224; 236 INJECTION, SOLUTION, CONCENTRATE INTRAVENOUS at 08:57

## 2021-12-13 RX ADMIN — PANTOPRAZOLE SODIUM 40 MG: 40 INJECTION, POWDER, FOR SOLUTION INTRAVENOUS at 08:44

## 2021-12-13 RX ADMIN — SODIUM CHLORIDE, PRESERVATIVE FREE 10 ML: 5 INJECTION INTRAVENOUS at 08:45

## 2021-12-13 RX ADMIN — LORAZEPAM 1 MG: 2 INJECTION INTRAMUSCULAR; INTRAVENOUS at 01:40

## 2021-12-13 RX ADMIN — POTASSIUM CHLORIDE 40 MEQ: 1500 TABLET, EXTENDED RELEASE ORAL at 17:51

## 2021-12-13 RX ADMIN — Medication 50 MEQ: at 13:51

## 2021-12-13 ASSESSMENT — PULMONARY FUNCTION TESTS: PIF_VALUE: 16

## 2021-12-13 NOTE — CARE COORDINATION
12/13/21, 11:21 AM EST  DISCHARGE PLANNING EVALUATION:    Trent Gallego       Admitted: 12/12/2021/ [address] day: 1   Location: -12/012-A Reason for admit: Drug overdose, intentional self-harm, initial encounter ([facility] [address] Julio Arreola  SSRI overdose, accidental or unintentional, initial encounter Trae Munguia   PMH:  has a past medical history of Lyme disease. To ED mistakenly took about 25 capsules of doxepin 150 mg at 9:55 AM today. Per , patient takes multiple over-the-counter vitamins and supplements daily along with doxepin 150 mg for her depression. Procedure:   12/12 intubated  12/13  extubated  Barriers to Discharge:  Sitter at bedside,  On room air, neuro checks, IVF. Afebrile, Heart rate 110. PCP: MICAELA Brand CNP  Readmission Risk Score: 7.6 ( )%    Patient Goals/Plan/Treatment Preferences: Met with Nathalie; she resides home with her spouse [facility]. She drives occasionally, uses no DME, has PCP, and is self pay. Danny Sanchezies from finance to see; declined [facility] or needs  . Transportation/Food Security/Housekeeping Addressed:  No issues identified.

## 2021-12-13 NOTE — PROGRESS NOTES
CRITICAL CARE H&P NOTE      Patient:  Valencia Shay    Unit/Bed:6-05/005-A  YOB: 1980  MRN: [id number]   PCP: MICAELA Galo CNP  Date of Admission: 12/12/2021  Chief Complaint: Drug Overdose    Assessment and Plan:    TCA drug overdose: Patient mistakenly took about 25 capsules of doxepin 150 mg at 9:55 AM today. Per , patient takes multiple over-the-counter vitamins and supplements daily along with doxepin 150 mg for her depression. Patient came in to the ED with normal mentation and no respiratory distress. Patient refused oral charcoal.  Patient had to be emergently intubated in ED as she came less responsive. Ethanol negative, acetaminophen level negative, salicylate level negative. Urine drug screen negative. Patient given 2 amps of bicarb and 2 g of magnesium in ED. Patient received 2 more mg of magnesium in ICU per poison control recommendation. 12/13 D/c bicarb drip 150 mL/hr. Will continue to monitor. Avoid antiarrythmic drugs. Hypotensive: Patient became hypotensive after the intubation. Received 2.5 L of IV fluids in the ED. Patient currently not requiring any pressors and normotensive. Continue to monitor, MAP >65. If hypotensive again, give IV fluids as patient was fluid responsive. QTC prolongation: Initial EKG showed unusual P axis, possible ectopic arterial tachycardia, vent rate 104, QTc 536. Repeat EKG shows . 12/13 QTc improved to 496. Checking EKG every hour for 4 occurrences to monitor QTc and continue to monitor on telemetry. Widened QRS: Initial EKG shows QRS duration 154. Repeat EKG shows . 12/13 QRS improved to 102. Checking EKG every hour for 4 occurrences to monitor QRS. Continue to monitor on telemetry. Hypokalemia: Potassium on arrival 3. Potassium, whole blood 2.7. Potassium replaced and cardiac potassium replacement in place. Most recent potassium 4.   Checking potassium every 4 hours and BMP daily.    Hypophosphatemia: 12/13 Phosphorus 1.3. Placed on phosphorus replacement protocol and replacing. [place] Metabolic acidosis: Likely secondary to TCA toxcity. ABG indicative of primary respiratory alkalosis, acute with secondary metabolic acidosis. Initial ABG shows pH 7.35, PCO2 42, PO2 252, HCO3 23. Repeat ABG shows improvement, pH 7.43, PCO2 34, PO2 143, HCO3 23. Will continue to monitor. History of depression: Patient takes doxepin 150 mg for depression/insomnia. Currently holding inpatient. History of insomnia: Patient takes temazepam 30 mg daily. Currently holding inpatient. INITIAL H AND P AND ICU COURSE:  Valencia Shya is a 42-year-old female with a past medical history of depression, insomnia, and Lyme disease. H&P reported by  as patient intubated sedated when arriving in the ICU. Per  patient took around 18 to 25 capsules of doxepin 150 mg at 9:55 AM today. She had prepared her medications in a prescription bottle for the morning before going to [employer] as she takes multiple over-the-counter vitamins and supplements along with her Doxepin daily. Patient takes the vitamins and supplements for her Lyme disease and does not follow a PCP. Patient noticed that she mistakenly took a bottle of doxepin instead of the other bottle with her vitamins and doxepin. Per the  they came to the hospital and wanted to have patient stomach pumped and leave. Patient refused to take the charcoal.  However patient became less responsive and had to be intubated in the ED. She also became hypotensive with intubation and received 2.5 L IV fluids. ED contacted poison control and they gave 2 amps of bicarb, 2g of magnesium as well as potassium. EKG showed unusual P axis, possible ectopic arterial tachycardia, vent rate 104, QRS duration 154, QTc 536. Initial ABG shows pH 7.35, PCO2 42, PO2 252, HCO3 23. Patient transferred to the ICU for further care and management.   I discussed with poison control in the ED recommended getting repeat ABG, repeat EKG, and giving 2 more grams of magnesium. Repeat ABG shows pH 7.43, PCO2 34, PO2 143, HCO3 23. Will continue to monitor with EKG, potassium, magnesium, and phosphorus levels. Patient's  concerned about when patient can come off sedation and be extubated. Discussed with him the importance of making sure patient does not have any cardiac arrhythmias and monitoring electrolytes. 12/13: QTC and QRS improving. Patient extubated and doing well. Patient stable, alert and not requiring any more ICU needs in transfer to [facility]. Past Medical History: Per HPI  Family History: Cancer in her mother and paternal grandmother. Diabetes in her maternal grandmother and paternal grandfather. Social History: No smoking history, does not drink alcohol or use illicit drugs. ROS   Unable to obtain ROS as patient is intubated sedated. Scheduled Meds:   calcium replacement protocol   Other RX Placeholder    calcium gluconate IVPB  2,000 mg IntraVENous Once    insulin lispro  0-12 Units SubCUTAneous TID WC    insulin lispro  0-6 Units SubCUTAneous Nightly    sodium chloride flush  5-40 mL IntraVENous 2 times per day    enoxaparin  40 mg SubCUTAneous Q24H    phosphorus replacement protocol   Other RX Placeholder    pantoprazole  40 mg IntraVENous Daily     Continuous Infusions:   dextrose      fentaNYL 500 mcg in sodium chloride 0.9% 100 ml infusion 25 mcg/hr (12/12/21 1108)    sodium bicarbonate infusion Stopped (12/13/21 1139)    sodium chloride Stopped (12/13/21 1139)    midazolam         PHYSICAL EXAMINATION:  T: 97.9.  P: 97. RR: 14. B/P: 106/72. FiO2:  30. O2 Sat:  98%. I/O: -6325  Body mass index is 23.79 kg/m². GCS:   10  General: Patient not in any respiratory distress, resting comfortably in bed. HEENT:  normocephalic and atraumatic. No scleral icterus. PERR  Neck: supple. No Thyromegaly. Lungs: clear to auscultation. No retractions  Cardiac: RRR. No JVD. Abdomen: soft. Nontender. Extremities:  No clubbing, cyanosis, or edema x 4. Vasculature: capillary refill < 3 seconds. Palpable dorsalis pedis pulses. Skin:  warm and dry. Psych: Patient intubated and sedated. Lymph:  No supraclavicular adenopathy. Neurologic:  No focal deficit. No seizures. Data: (All radiographs, tracings, PFTs, and imaging are personally viewed and interpreted unless otherwise noted). 12/13 BMP- Sodium 134, potassium 2.6, chloride 96, CO2 27, glucose 394, BUN 4, creatinine 0.5, calcium 6.2, anion gap 11, GFR >90  12/13 CBC-WBC 10.3, hemoglobin 11.3, hematocrit 33, platelets 387  65/16 magnesium 1.5, potassium 2.6, phosphorus 1.3  12/13 EKG shows sinus tachycardia, , .  12/12 Hepatic function panel: Albumin 4.5, total bilirubin 0.6, direct bilirubin <0.2, alkaline phosphatase 59, AST 25, ALT 18, total protein 7.3  Ethanol level <0.01  Acetaminophen level <5  Salicylate level <3.0  hCG quantitative <0.1  Magnesium 2.1  Initial ABG shows pH 7.35, PCO2 42, PO2 252, HCO3 23. Repeat ABG shows improvement, pH 7.43, PCO2 34, PO2 143, HCO3 23  Urine drug screen negative  UA shows trace protein negative for nitrate and leukocyte and no bacteria seen. Urine culture pending  12/12 chest x-ray shows ET tube and enteric tube in good position without evidence of acute abnormality. Lungs are clear. Pulmonary vasculature and cardiac mediastinal silhouette are within normal limits. Covid negative  MRSA culture pending  Respiratory culture pending  Telemetry shows tachycardia. Meets Continued ICU Level Care Criteria:    [] Yes   [x] No - Transfer Planned to listed location: 6K-5  [x] HOSPITALIST CONTACTED- DR. TRAYLOR    Case and plan discussed with Dr. Alessandra Oden. Electronically signed by Johann Johnson DO  [address]  Patient seen by me. Case discussed with resident physician. Agree with transition to medical floor.   Italicized font represents changes to the note made by me.     Electronically signed by Morris Teresa MD on 12/13/2021 at 7:47 PM

## 2021-12-13 NOTE — SIGNIFICANT EVENT
Patient currently stable and not requiring any ICU needs. Patient to be transferred to [facility]. I gave signout to Dr. Trice Sabillon, hospitalist, who has accepted the patient.

## 2021-12-13 NOTE — PROGRESS NOTES
Physician Progress Note      Abraham Fleming  CSN #:                  [id number]  :                       1980  ADMIT DATE:       2021 10:19 AM  DISCH DATE:  RESPONDING  PROVIDER #:        ERIC TRAYLOR MD          QUERY TEXT:    Pt admitted with drug overdose. Pt noted to have AMS, combative with staff,   thrashing in bed. If possible, please document in the progress notes and   discharge summary if you are evaluating and / or treating any of the   following: The medical record reflects the following:    Risk Factors: drug overdose  Clinical Indicators: presents after taking 25 capsules of Doxepin, in ED   mental status declined and became less alert requiring intubation, s/p   extubation patient thrashing in bed, screaming, attempting to get out of bed   and hit staff  Treatment: IV Ativan, Sitter at bedside, IVF    Thank you! Veronia Reddish, Delle Phoenix, TENR  RN Clinical   P: [phone]  Options provided:  -- Toxic encephalopathy  -- Drug-induced encephalopathy due to Doxepin  -- Metabolic encephalopathy  -- Toxic metabolic encephalopathy  -- Other - I will add my own diagnosis  -- Disagree - Not applicable / Not valid  -- Disagree - Clinically unable to determine / Unknown  -- Refer to Clinical Documentation Reviewer    PROVIDER RESPONSE TEXT:    This patient has drug-induced encephalopathy due to Doxepin.     Query created by: Janee Galan on 2021 10:28 AM      Electronically signed by:  Cyn Dubois MD 2021 2:10 PM

## 2021-12-13 NOTE — PROGRESS NOTES
Patient woke up and became very combative and panicky. Dr. Caprice Hernandez came to bedside and advised to extubate patient. Patient extubated per Dr. Caprice Hernandez to nasal cannula. Patient resting comfortably now. Spo2 98%.

## 2021-12-13 NOTE — PLAN OF CARE
Patient is a 68-year-old female with a past medical history of depression, insomnia, history of Lyme disease who presented to [facility] ED 12/12 for drug overdose. Per ED report the patient had prepared her medications for the morning including doxepin and over-the-counter vitamins. Per , on the way to [employer] the patient mistakenly had taken the bottle of doxepin instead. She denied any suicidal or homicidal ideations. In the ED she was found to be tachycardic in 140s, and altered. She was given activated charcoal which resolved mental status temporarily. However upon reassessment mental status rapidly declined. The patient was intubated for airway protection. Following this the patient was hypotensive, IVFs were started. ABG revealed hypokalemia. EKG significant for widened QRS and elevated QTC. The patient was given magnesium and potassium repletion. Bicarb infusion was started. Poison control consulted. The patient was transferred to ICU for further valuation and management. In the ICU she was being treated for TCA drug overdose with resultant  non-anion gap metabolic acidosis with magnesium and bicarb; QTC prolongation, widening QRS and hypokalemia with electrolyte repletion and serial EKGs. 12/13 the patient woke up combative and panic. Sedation was turned off and she was extubated to nasal cannula. However she continued to remain agitated and combative. She was given Ativan with resolution, sitter ordered at bedside. 12/13 The patient was considered stable for transfer to ICU stepdown. QTC trending down, QRS narrowing. Will decrease duration of serial EKGs. She continues remain hypokalemic, hypocalcemic and hypophosphatemic. Will replete. DX: drug induced encephalopathy secondary to doxepin toxicity.

## 2021-12-14 VITALS
HEART RATE: 97 BPM | SYSTOLIC BLOOD PRESSURE: 111 MMHG | DIASTOLIC BLOOD PRESSURE: 83 MMHG | BODY MASS INDEX: 23.84 KG/M2 | WEIGHT: 151.9 LBS | OXYGEN SATURATION: 98 % | HEIGHT: 67 IN | TEMPERATURE: 97.7 F | RESPIRATION RATE: 18 BRPM

## 2021-12-14 LAB
ANION GAP SERPL CALCULATED.3IONS-SCNC: 13 MEQ/L (ref 8–16)
BUN BLDV-MCNC: 4 MG/DL (ref 7–22)
CALCIUM IONIZED: 1.12 MMOL/L (ref 1.12–1.32)
CALCIUM SERPL-MCNC: 8.5 MG/DL (ref 8.5–10.5)
CHLORIDE BLD-SCNC: 108 MEQ/L (ref 98–111)
CO2: 21 MEQ/L (ref 23–33)
CREAT SERPL-MCNC: 0.7 MG/DL (ref 0.4–1.2)
EKG ATRIAL RATE: 103 BPM
EKG ATRIAL RATE: 92 BPM
EKG P AXIS: 71 DEGREES
EKG P AXIS: 76 DEGREES
EKG P-R INTERVAL: 142 MS
EKG P-R INTERVAL: 154 MS
EKG Q-T INTERVAL: 358 MS
EKG Q-T INTERVAL: 366 MS
EKG QRS DURATION: 94 MS
EKG QRS DURATION: 98 MS
EKG QTC CALCULATION (BAZETT): 452 MS
EKG QTC CALCULATION (BAZETT): 468 MS
EKG R AXIS: 11 DEGREES
EKG R AXIS: 33 DEGREES
EKG T AXIS: 70 DEGREES
EKG T AXIS: 84 DEGREES
EKG VENTRICULAR RATE: 103 BPM
EKG VENTRICULAR RATE: 92 BPM
ERYTHROCYTE [DISTWIDTH] IN BLOOD BY AUTOMATED COUNT: 12.5 % (ref 11.5–14.5)
ERYTHROCYTE [DISTWIDTH] IN BLOOD BY AUTOMATED COUNT: 45.3 FL (ref 35–45)
GFR SERPL CREATININE-BSD FRML MDRD: > 90 ML/MIN/1.73M2
GLUCOSE BLD-MCNC: 76 MG/DL (ref 70–108)
GLUCOSE BLD-MCNC: 84 MG/DL (ref 70–108)
HCT VFR BLD CALC: 38.7 % (ref 37–47)
HEMOGLOBIN: 12.7 GM/DL (ref 12–16)
MCH RBC QN AUTO: 32.6 PG (ref 26–33)
MCHC RBC AUTO-ENTMCNC: 32.8 GM/DL (ref 32.2–35.5)
MCV RBC AUTO: 99.2 FL (ref 81–99)
MRSA SCREEN: NORMAL
PLATELET # BLD: 216 THOU/MM3 (ref 130–400)
PMV BLD AUTO: 9.2 FL (ref 9.4–12.4)
POTASSIUM REFLEX MAGNESIUM: 3.7 MEQ/L (ref 3.5–5.2)
RBC # BLD: 3.9 MILL/MM3 (ref 4.2–5.4)
SODIUM BLD-SCNC: 142 MEQ/L (ref 135–145)
WBC # BLD: 8.7 THOU/MM3 (ref 4.8–10.8)

## 2021-12-14 PROCEDURE — 85027 COMPLETE CBC AUTOMATED: CPT

## 2021-12-14 PROCEDURE — 82948 REAGENT STRIP/BLOOD GLUCOSE: CPT

## 2021-12-14 PROCEDURE — 93010 ELECTROCARDIOGRAM REPORT: CPT | Performed by: INTERNAL MEDICINE

## 2021-12-14 PROCEDURE — 2580000003 HC RX 258: Performed by: STUDENT IN AN ORGANIZED HEALTH CARE EDUCATION/TRAINING PROGRAM

## 2021-12-14 PROCEDURE — 93005 ELECTROCARDIOGRAM TRACING: CPT | Performed by: STUDENT IN AN ORGANIZED HEALTH CARE EDUCATION/TRAINING PROGRAM

## 2021-12-14 PROCEDURE — 99239 HOSP IP/OBS DSCHRG MGMT >30: CPT | Performed by: FAMILY MEDICINE

## 2021-12-14 PROCEDURE — 82330 ASSAY OF CALCIUM: CPT

## 2021-12-14 PROCEDURE — 36415 COLL VENOUS BLD VENIPUNCTURE: CPT

## 2021-12-14 PROCEDURE — 80048 BASIC METABOLIC PNL TOTAL CA: CPT

## 2021-12-14 RX ORDER — DOXEPIN HYDROCHLORIDE 150 MG/1
150 CAPSULE ORAL NIGHTLY
Qty: 14 CAPSULE | Refills: 0 | Status: SHIPPED | OUTPATIENT
Start: 2021-12-14 | End: 2021-12-28

## 2021-12-14 RX ADMIN — SODIUM CHLORIDE, PRESERVATIVE FREE 10 ML: 5 INJECTION INTRAVENOUS at 09:57

## 2021-12-14 NOTE — DISCHARGE SUMMARY
Hospital Medicine Discharge Summary      Patient Identification:   Barbara Navarrete   : 1980  MRN: [id number]   Account: [de-identified]      Patient's PCP: MICAELA Rodney - CNP    Admit Date: 2021     Discharge Date:   2021    Admitting Physician: Zuleima Segura MD     Discharge Physician: Sunil Collins DO     Discharge Diagnoses:    1. Drug-induced encephalopathy, resolved - secondary to doxepin and toxicity. Per report unintentionally took 25 tablets of doxepin 150 mg . Initially tachycardic and altered. Ethanol, acetaminophen salicylate levels WNL. UDS negative. Poison control consulted, recommended starting on sodium bicarb and magnesium. Intubated for worsening AMS and concerns of airway protection.  extubated, discontinued bicarb drip. Educated patient importance on medication due diligence. 2.  Acute hypoxic respiratory failure, resoved - see above, intubated  for airway protection. Extubated . 3.  Significant EKG findings - initial EKG revealed QTc 536 . Serial EKGs ordered. Started on electrolyte replacement and bicarb.  improving, , QRS 98    4. Hypokalemia, resolved - presented potassium 3, all blood potassium 2.7. Started on potassium replacement protocol.  potassium 3.7    5. Hypophosphatemia, improving -present phosphorus 1.3. Started on phosphorus replacement protocol.  phosphorus 2.1    6. Nonnion gap metabolic acidosis, resolving - likely secondary to TCA toxicity. Patient diabetes indicative of respiratory alkalosis, acute with secondary metabolic acidosis. S/p bicarb drip. 7.  Depression - noted. Resumed doxepin 150 mg p.o. daily    8. Insomnia - noted resumed temazepam 30 mg p.o. daily    9. History of lyme disease - noted. The patient was seen and examined on day of discharge and this discharge summary is in conjunction with any daily progress note from day of discharge.     [facility] Course: The patient is a 30-year-old female with a past medical history of depression, insomnia, history of Lyme disease who presented to [facility] ED 12/12 for drug overdose. Per ED report the patient had prepared her medications for the morning glue doxepin and over-the-counter vitamins. Per , on the way to [employer] the patient mistakenly had taken the bottle of doxepin instead. She denies any suicidal or homicidal ideations. In the ED she was found to be tachycardic in the 140s, and altered. She was given activated charcoal which resolved mental status temporarily. However upon reassessment mental status rapidly declined. The patient was intubated for airway protection. Following this the patient was hypotensive, IVFs were started. ABG revealed hypokalemia. EKG significant for widened QRS and elevated QTC. The patient was given magnesium and potassium repletion. Bicarb infusion was started. Poison control consulted. The patient was transferred to ICU for further evaluation and management. In the ICU she was treated for TCA drug overdose with resultant nonanion gap metabolic acidosis with magnesium and bicarb. Serial EKGs were conducted to monitor QTC prolongation and widening QRS. Electrolytes were repleted as appropriate. 12/13 the patient woke up combative and tympanic. Sedation was turned off and she was extubated to nasal cannula. The patient continued remain agitated and combative, this resolved with Ativan. Sitter was ordered at bedside. Her agitation subsided. The patient was stable for transfer to ICU stepdown. During hospitalization course electrolytes, QTc and QRS findings on EKG continue to improve. This morning the patient is doing well.  is present at bedside. She denies any new complaints. She states she is ready to go home. Reviewed importance of proper dosing and due diligence with medications. The patient acknowledged this.   She is medically stable for discharge. Exam:     Vitals:  Vitals:    12/13/21 2020 12/13/21 2308 12/14/21 0301 12/14/21 0735   BP: 127/79 (!) 122/92 122/83 111/83   Pulse: 103 94 101 97   Resp: 16 16 16 18   Temp: 98.6 °F (37 °C) 98.2 °F (36.8 °C) 98.6 °F (37 °C) 97.7 °F (36.5 °C)   TempSrc: Oral Oral Oral Oral   SpO2: 97% 98% 97% 98%   Weight:       Height:         Weight: Weight: 151 lb 14.4 oz (68.9 kg)     24 hour intake/output:    Intake/Output Summary (Last 24 hours) at 12/14/2021 5259  Last data filed at 12/14/2021 0315  Gross per 24 hour   Intake 3365.84 ml   Output 9075 ml   Net -5709.16 ml         General appearance:  No apparent distress, appears stated age and cooperative. HEENT:  Normal cephalic, atraumatic without obvious deformity. Pupils equal, round, and reactive to light. Extra ocular muscles intact. Conjunctivae/corneas clear. Neck: Supple, with full range of motion. No jugular venous distention. Trachea midline. Respiratory:  Normal respiratory effort. Clear to auscultation, bilaterally without Rales/Wheezes/Rhonchi. Cardiovascular:  Regular rate and rhythm with normal S1/S2 without murmurs, rubs or gallops. Abdomen: Soft, non-tender, non-distended with normal bowel sounds. Musculoskeletal:  No clubbing, cyanosis or edema bilaterally. Full range of motion without deformity. Skin: Skin color, texture, turgor normal.  No rashes or lesions. Neurologic:  Neurovascularly intact without any focal sensory/motor deficits. Cranial nerves: II-XII intact, grossly non-focal.  Psychiatric:  Alert and oriented, thought content appropriate, normal insight  Capillary Refill: Brisk,< 3 seconds   Peripheral Pulses: +2 palpable, equal bilaterally       Labs:  For convenience and continuity at follow-up the following most recent labs are provided:      CBC:    Lab Results   Component Value Date    WBC 8.7 12/14/2021    HGB 12.7 12/14/2021    HCT 38.7 12/14/2021     12/14/2021       Renal:    Lab Results   Component Value Date     12/14/2021    K 3.7 12/14/2021     12/14/2021    CO2 21 12/14/2021    BUN 4 12/14/2021    CREATININE 0.7 12/14/2021    CALCIUM 8.5 12/14/2021    PHOS 2.1 12/13/2021    PHOS 2.1 12/13/2021         Significant Diagnostic Studies    Radiology:   XR CHEST PORTABLE   Final Result   Endotracheal tube and enteric tube in good position without evidence of acute abnormality. **This report has been created using voice recognition software. It may contain minor errors which are inherent in voice recognition technology. **      Final report electronically signed by Dr. Corry Torres MD on 12/12/2021 11:04 AM             Consults:     None    Disposition: Home  Condition at Discharge: Stable    Code Status:  Full Code     Patient Instructions: Activity: activity as tolerated  Diet: ADULT DIET; Regular      Follow-up visits:   No follow-up provider specified. Discharge Medications:        Medication List      ASK your doctor about these medications    doxepin 150 MG capsule  Commonly known as: SINEQUAN  Take 1 capsule by mouth nightly     temazepam 15 MG capsule  Commonly known as: RESTORIL             Time Spent on discharge is more than 30 minutes in the examination, evaluation, counseling and review of medications and discharge plan. Discharge Medications for PCI/MI (performed or attempted):            Signed: Thank you MICAELA Oakley CNP for the opportunity to be involved in this patient's care.     Electronically signed by Zhang Jaramillo DO on 12/14/2021 at 9:05 AM

## 2021-12-14 NOTE — FLOWSHEET NOTE
12/14/21 1255   Provider Notification   Reason for Communication Review case   Provider Name [facility]   Provider Notification Physician   Method of Communication Secure Message   Response No new orders   Notification Time [phone]   This RN spoke with poison center Dr Braulio Bentley MD and he asked that the physician taking care of Room 05 give them a call back. Secure message was sent.

## 2021-12-14 NOTE — CARE COORDINATION
12/14/21, 11:52 AM EST    Home with . Patient goals/plan/ treatment preferences discussed by  and . Patient goals/plan/ treatment preferences reviewed with patient/ family. Patient/ family verbalize understanding of discharge plan and are in agreement with goal/plan/treatment preferences. Understanding was demonstrated using the teach back method. AVS provided by RN at time of discharge, which includes all necessary medical information pertaining to the patients current course of illness, treatment, post-discharge goals of care, and treatment preferences.

## 2021-12-14 NOTE — PROGRESS NOTES
Discharge teaching and instructions for diagnosis/procedure of accidental drug overdose completed with patient using teachback method. AVS reviewed. Printed prescriptions given to patient. Patient voiced understanding regarding prescriptions, follow up appointments, and care of self at home. Discharged ambulatory to  home with support per family. Patient left in stable condition.

## 2021-12-14 NOTE — PROGRESS NOTES
CLINICAL PHARMACY: DISCHARGE MED RECONCILIATION/REVIEW    [facility] Select Patient?: No  Total # of Interventions Recommended: 0     Total # Interventions Accepted: 0  Intervention Severity:   - Level 1 Intervention Present?: No   - Level 2 #: 0   - Level 3 #: 0   Time Spent (min):  20    Additional Documentation:    Discharge medication reconciliation reviewed and complete.     Kusum Pedroza PharmD, BCPS  12/14/2021  11:37 AM

## 2021-12-15 ENCOUNTER — TELEPHONE (OUTPATIENT)
Dept: FAMILY MEDICINE CLINIC | Age: 41
End: 2021-12-15

## 2021-12-15 LAB
ORGANISM: ABNORMAL
URINE CULTURE, ROUTINE: ABNORMAL

## 2021-12-15 NOTE — TELEPHONE ENCOUNTER
[facility] Transitions Initial Follow Up Call    Outreach made within 2 business days of discharge: Yes    Patient: Israel Barrientos Patient : 1980   MRN: [id number]  Reason for Admission: There are no discharge diagnoses documented for the most recent discharge.   Discharge Date: 21       Spoke with: DANN FULL    Discharge department/facility: [facility]      Scheduled appointment with PCP within 7-14 days    Follow Up  Future Appointments   Date Time Provider Rad Serna   2021  1:00 PM MICAELA Fam - DANIELLE [facility] [place], [place]

## 2021-12-17 NOTE — TELEPHONE ENCOUNTER
[facility] Transitions Initial Follow Up Call    Outreach made within 2 business days of discharge: Yes    Patient: Mary Morales Patient : 1980   MRN: [id number]  Reason for Admission: There are no discharge diagnoses documented for the most recent discharge. Discharge Date: 21       Spoke with: Nathalie    Discharge department/facility: [facility]    TCM Interactive Patient Contact:  Was patient able to fill all prescriptions: Yes  Was patient instructed to bring all medications to the follow-up visit: Yes  Is patient taking all medications as directed in the discharge summary? Yes  Does patient understand their discharge instructions: Yes  Does patient have questions or concerns that need addressed prior to 7-14 day follow up office visit: no    Scheduled appointment with PCP within 7-14 days    Follow Up    Pt states she will call if she feels she needs to be seen. No future appointments.       Jad Perez, CMA

## 2021-12-30 NOTE — PROGRESS NOTES
CLINICAL PHARMACY NOTE: MEDS TO BEDS    Total # of Prescriptions Filled: 1   The following medications were delivered to the patient:  Doxepin 75 mg    Additional Documentation:

## 2025-04-23 NOTE — H&P
CRITICAL CARE H&P NOTE      Patient:  Jacinda Rodney    Unit/Bed:4D-12/012-A  YOB: 1980  MRN: [id number]   PCP: MICAELA Naqvi CNP  Date of Admission: 12/12/2021  Chief Complaint: Drug Overdose    Assessment and Plan:    1. TCA drug overdose: Patient mistakenly took about 25 capsules of doxepin 150 mg at 9:55 AM today. Per , patient takes multiple over-the-counter vitamins and supplements daily along with doxepin 150 mg for her depression. Patient came in to the ED with normal mentation and no respiratory distress. Patient refused oral charcoal.  Patient had to be emergently intubated in ED as she came less responsive. Ethanol negative, acetaminophen level negative, salicylate level negative. Urine drug screen negative. Patient given 2 amps of bicarb and 2 g of magnesium in ED. Patient received 2 more mg of magnesium in ICU per poison control recommendation. Currently on bicarb drip 150 mL/hr. Will continue to monitor. Avoid antiarrythmic drugs. 2. Hypotensive: Patient became hypotensive after the intubation. Received 2.5 L of IV fluids in the ED. Patient currently not requiring any pressors and normotensive. Continue to monitor, MAP >65. If hypotensive again, give IV fluids as patient was fluid responsive. 3. QTC prolongation: Initial EKG showed unusual P axis, possible ectopic arterial tachycardia, vent rate 104, QTc 536. Repeat EKG shows . Checking EKG every hour for 4 occurrences to monitor QTc and continue to monitor on telemetry. 4. Widened QRS: Initial EKG shows QRS duration 154. Repeat EKG shows . Checking EKG every hour for 4 occurrences to monitor QRS. Continue to monitor on telemetry. 5. Hypokalemia: Potassium on arrival 3. Potassium, whole blood 2.7. Potassium replaced and cardiac potassium replacement in place. Most recent potassium 3.8. Checking potassium every 4 hours and BMP daily. 6. Hypophosphatemia: Phosphorus 1.6.   Placed Initial (On Arrival) on phosphorus replacement protocol and replacing. 7. [place] Metabolic acidosis: Likely secondary to TCA toxcity. ABG indicative of primary respiratory alkalosis, acute with secondary metabolic acidosis. Initial ABG shows pH 7.35, PCO2 42, PO2 252, HCO3 23. Repeat ABG shows improvement, pH 7.43, PCO2 34, PO2 143, HCO3 23. Will continue to monitor. 8. History of depression: Patient takes doxepin 150 mg for depression/insomnia. Currently holding inpatient. 9. History of insomnia: Patient takes temazepam 30 mg daily. Currently holding inpatient. INITIAL H AND P AND ICU COURSE:  Pb Melendez is a 70-year-old female with a past medical history of depression, insomnia, and Lyme disease. H&P reported by  as patient intubated sedated when arriving in the ICU. Per  patient took around 18 to 25 capsules of doxepin 150 mg at 9:55 AM today. She had prepared her medications in a prescription bottle for the morning before going to [facility] as she takes multiple over-the-counter vitamins and supplements along with her Doxepin daily. Patient takes the vitamins and supplements for her Lyme disease and does not follow a PCP. Patient noticed that she mistakenly took a bottle of doxepin instead of the other bottle with her vitamins and doxepin. Per the  they came to the hospital and wanted to have patient stomach pumped and leave. Patient refused to take the charcoal.  However patient became less responsive and had to be intubated in the ED. She also became hypotensive with intubation and received 2.5 L IV fluids. ED contacted poison control and they gave 2 amps of bicarb, 2g of magnesium as well as potassium. EKG showed unusual P axis, possible ectopic arterial tachycardia, vent rate 104, QRS duration 154, QTc 536. Initial ABG shows pH 7.35, PCO2 42, PO2 252, HCO3 23. Patient transferred to the ICU for further care and management.   I discussed with poison control in the ED recommended getting repeat ABG, repeat EKG, and giving 2 more grams of magnesium. Repeat ABG shows pH 7.43, PCO2 34, PO2 143, HCO3 23. Will continue to monitor with EKG, potassium, magnesium, and phosphorus levels. Patient's  concerned about when patient can come off sedation and be extubated. Discussed with him the importance of making sure patient does not have any cardiac arrhythmias and monitoring electrolytes. Past Medical History: Per HPI  Family History: Cancer in her mother and paternal grandmother. Diabetes in her maternal grandmother and paternal grandfather. Social History: No smoking history, does not drink alcohol or use illicit drugs. ROS   Unable to obtain ROS as patient is intubated sedated. Scheduled Meds:   charcoal        sodium chloride flush  5-40 mL IntraVENous 2 times per day    enoxaparin  40 mg SubCUTAneous Q24H    potassium (CARDIAC) replacement protocol   Other RX Placeholder    magnesium replacement protocol   Other RX Placeholder    phosphorus replacement protocol   Other RX Placeholder    potassium phosphate IVPB  10 mmol IntraVENous Once     Continuous Infusions:   fentaNYL 500 mcg in sodium chloride 0.9% 100 ml infusion 25 mcg/hr (12/12/21 1108)    dexmedetomidine      sodium bicarbonate infusion 150 mL/hr at 12/12/21 1342    sodium chloride         PHYSICAL EXAMINATION:  T: 92.5.  P: 82. RR: 14. B/P: 97/66. FiO2:  30. O2 Sat:  99%. I/O: N/A  Body mass index is 23.79 kg/m². GCS:   10  PC: 14. PIP 16. PEEP 6. General:   HEENT:  normocephalic and atraumatic. No scleral icterus. PERR  Neck: supple. No Thyromegaly. Lungs: clear to auscultation. No retractions  Cardiac: RRR. No JVD. Abdomen: soft. Nontender. Extremities:  No clubbing, cyanosis, or edema x 4. Vasculature: capillary refill < 3 seconds. Palpable dorsalis pedis pulses. Skin:  warm and dry. Psych: Patient intubated and sedated. Lymph:  No supraclavicular adenopathy.   Neurologic:  No focal deficit. No seizures. Data: (All radiographs, tracings, PFTs, and imaging are personally viewed and interpreted unless otherwise noted).  12/12 BMP- Sodium 138, potassium 3, chloride 103, CO2 21, glucose 134, BUN 8, creatinine 0.8, calcium 9, anion gap 14, GFR 80   12/12 CBC-WBC 5.5, hemoglobin 14.2, hematocrit 41.7, platelets 566   Hepatic function panel: Albumin 4.5, total bilirubin 0.6, direct bilirubin <0.2, alkaline phosphatase 59, AST 25, ALT 18, total protein 7.3   Ethanol level <0.01   Acetaminophen level <5   Salicylate level <1.7   hCG quantitative <0.1   Magnesium 2.1   Initial ABG shows pH 7.35, PCO2 42, PO2 252, HCO3 23. Repeat ABG shows improvement, pH 7.43, PCO2 34, PO2 143, HCO3 23   Urine drug screen negative   UA shows trace protein negative for nitrate and leukocyte and no bacteria seen.  Urine culture pending   12/12 chest x-ray shows ET tube and enteric tube in good position without evidence of acute abnormality. Lungs are clear. Pulmonary vasculature and cardiac mediastinal silhouette are within normal limits.  Covid negative   MRSA culture pending   Respiratory culture pending   Telemetry shows NSR. Meets Continued ICU Level Care Criteria:    [x] Yes   [] No - Transfer Planned to listed location:  [] HOSPITALIST CONTACTED-      Case and plan discussed with Dr. Ebonie Gutierrez.     Electronically signed by Meg Washburn DO  CRITICAL CARE SPECIALIST